# Patient Record
Sex: FEMALE | Race: WHITE | Employment: STUDENT | URBAN - METROPOLITAN AREA
[De-identification: names, ages, dates, MRNs, and addresses within clinical notes are randomized per-mention and may not be internally consistent; named-entity substitution may affect disease eponyms.]

---
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
## 2022-01-12 NOTE — H&P (VIEW-ONLY)
[facility] - Gastroenterology                                                                                                               Reason for consult: abd pain    Requesting physician or provider: Windy Prince MD    P [facility] (Girls, 2-20 Years) data. History, Medications, Allergies, ROS:      History reviewed. No pertinent past medical history. History reviewed. No pertinent surgical history. Family Hx: History reviewed. No pertinent family history.    Social Histor rashes  HEMATOLOGIC: No bleeding, no bruising  NEURO: Alert and interactive, normal gait    Labs/Imaging/Procedures:     Component   Ref Range & Units 10/12/21 11:20 AM   H. pylori Breath Test   Negative Negative      Component   Ref Range & Units 7/6/20   % 54.1    Lymphocyte %   % 37.8    Monocyte %   % 6.3    Eosinophil %   % 1.0    Basophil %   % 0.5    Immature Granulocyte %   % 0.3      .   ASSESSMENT/PLAN:   Catarino Reardon is a 25year old year-old female without significant PMHx:     #gerd  #bloat exist.

## 2022-01-12 NOTE — H&P
[facility] - Gastroenterology                                                                                                               Reason for consult: abd pain    Requesting physician or provider: Rhianna Graf MD    P [facility] (Girls, 2-20 Years) data. History, Medications, Allergies, ROS:      History reviewed. No pertinent past medical history. History reviewed. No pertinent surgical history. Family Hx: History reviewed. No pertinent family history.    Social Histor rashes  HEMATOLOGIC: No bleeding, no bruising  NEURO: Alert and interactive, normal gait    Labs/Imaging/Procedures:     Component   Ref Range & Units 10/12/21 11:20 AM   H. pylori Breath Test   Negative Negative      Component   Ref Range & Units 7/6/20   % 54.1    Lymphocyte %   % 37.8    Monocyte %   % 6.3    Eosinophil %   % 1.0    Basophil %   % 0.5    Immature Granulocyte %   % 0.3      .   ASSESSMENT/PLAN:   Brook Smith is a 25year old year-old female without significant PMHx:     #gerd  #bloat exist.

## 2022-01-14 RX ORDER — PANTOPRAZOLE SODIUM 40 MG/1
TABLET, DELAYED RELEASE ORAL
Qty: 90 TABLET | Refills: 0 | OUTPATIENT
Start: 2022-01-14

## 2022-01-14 RX ORDER — PANTOPRAZOLE SODIUM 40 MG/1
TABLET, DELAYED RELEASE ORAL
Qty: 30 TABLET | Refills: 1 | Status: SHIPPED | OUTPATIENT
Start: 2022-01-14

## 2022-01-18 ENCOUNTER — OFFICE VISIT (OUTPATIENT)
Dept: GASTROENTEROLOGY | Facility: CLINIC | Age: 22
End: 2022-01-18
Payer: COMMERCIAL

## 2022-01-18 ENCOUNTER — TELEPHONE (OUTPATIENT)
Dept: GASTROENTEROLOGY | Facility: CLINIC | Age: 22
End: 2022-01-18

## 2022-01-18 VITALS
HEART RATE: 58 BPM | DIASTOLIC BLOOD PRESSURE: 69 MMHG | HEIGHT: 70 IN | WEIGHT: 174 LBS | BODY MASS INDEX: 24.91 KG/M2 | SYSTOLIC BLOOD PRESSURE: 123 MMHG

## 2022-01-18 DIAGNOSIS — R11.0 NAUSEA: ICD-10-CM

## 2022-01-18 DIAGNOSIS — R10.33 PERIUMBILICAL ABDOMINAL PAIN: ICD-10-CM

## 2022-01-18 DIAGNOSIS — K21.9 GASTROESOPHAGEAL REFLUX DISEASE WITHOUT ESOPHAGITIS: Primary | ICD-10-CM

## 2022-01-18 DIAGNOSIS — R14.0 BLOATING: ICD-10-CM

## 2022-01-18 DIAGNOSIS — K21.9 GASTROESOPHAGEAL REFLUX DISEASE, UNSPECIFIED WHETHER ESOPHAGITIS PRESENT: Primary | ICD-10-CM

## 2022-01-18 PROCEDURE — 99244 OFF/OP CNSLTJ NEW/EST MOD 40: CPT | Performed by: NURSE PRACTITIONER

## 2022-01-18 PROCEDURE — 3078F DIAST BP <80 MM HG: CPT | Performed by: NURSE PRACTITIONER

## 2022-01-18 PROCEDURE — 3008F BODY MASS INDEX DOCD: CPT | Performed by: NURSE PRACTITIONER

## 2022-01-18 PROCEDURE — 3074F SYST BP LT 130 MM HG: CPT | Performed by: NURSE PRACTITIONER

## 2022-01-18 NOTE — PATIENT INSTRUCTIONS
-continue pantoprazole 40 mg/daily  -FDgard  -dicyclomine as needed  -labs  -attempt to identify dietary triggers by keeping a food journal    egd w/ [facility] w/ Dr. Delgado Batch  Dx: gerd, bloating, nausea, periumbilical abd pain    Tips to Control Acid Reflux    To

## 2022-01-18 NOTE — TELEPHONE ENCOUNTER
Scheduled for: EGD 39780    Provider Name: Dr Esthela Colon  Date: Wed 1/27/2022   Location: [address] [place] [address]    Sedation: MAC    Time: 1:30 pm   Prep:  Nothing after midnight the day before procedure and NPO 3 hrs prior procedure  Meds/Allergies Reconciled?: NKDA    Diagnosi

## 2022-01-21 RX ORDER — NORETHINDRONE ACETATE AND ETHINYL ESTRADIOL 1.5-30(21)
1 KIT ORAL DAILY
Qty: 84 TABLET | Refills: 4 | Status: SHIPPED | OUTPATIENT
Start: 2022-01-21 | End: 2023-02-22

## 2022-01-21 NOTE — TELEPHONE ENCOUNTER
Please Review. Protocol Failed or has no protocol.        Requested Prescriptions   Pending Prescriptions Disp Refills    MICROGESTIN FE 1.5/30 1.5-30 MG-MCG Oral Tab [Pharmacy Med Name: Cooper Driscoll 1.5/30 FE TABLETS 28S] 84 tablet 0     Sig: TAKE 1 TABLET BY MOUTH DAILY        Gynecology Medication Protocol Failed - 1/20/2022  8:02 AM        Failed - Pass dependent on manual look-up of last PAP and patient compliance with PAP follow up recommendations        Passed - Appointment in past 12 or next 3 months            Recent Outpatient Visits              3 days ago Gastroesophageal reflux disease, unspecified whether esophagitis present    [address], [facility] [place], [facility], APR    Office Visit    2 months ago Abdominal cramping    Franklin Ferreira MD    Office Visit    3 months ago Gastroesophageal reflux disease, unspecified whether esophagitis present    [address]    Office Visit    1 year ago Well adolescent visit    [address]    Office Visit    2 years ago Sore throat    [address] [facility] [address] [facility] [address] [facility], [facility]    Office Visit          Future Appointments         Provider Department Appt Notes    In 6 days RASHAUN, PROCEDURE [address] GI PROCEDURE EGD @ [facility]

## 2022-01-22 NOTE — PAT NURSING NOTE
Verified with patient that procedure will be performed at [facility] and not at [facility] AND [facility], address provided. Informed no visitors allowed in facility, patient verbalized understanding and agreed.

## 2022-01-24 ENCOUNTER — LAB ENCOUNTER (OUTPATIENT)
Dept: LAB | Age: 22
End: 2022-01-24
Attending: INTERNAL MEDICINE
Payer: COMMERCIAL

## 2022-01-24 DIAGNOSIS — Z01.818 PRE-OP TESTING: ICD-10-CM

## 2022-01-26 LAB — SARS-COV-2 RNA RESP QL NAA+PROBE: NOT DETECTED

## 2022-01-27 ENCOUNTER — HOSPITAL ENCOUNTER (OUTPATIENT)
Age: 22
Setting detail: HOSPITAL OUTPATIENT SURGERY
Discharge: HOME | End: 2022-01-27
Attending: INTERNAL MEDICINE | Admitting: INTERNAL MEDICINE
Payer: COMMERCIAL

## 2022-01-27 ENCOUNTER — ANESTHESIA EVENT (OUTPATIENT)
Dept: ENDOSCOPY | Age: 22
End: 2022-01-27
Payer: COMMERCIAL

## 2022-01-27 ENCOUNTER — ANESTHESIA (OUTPATIENT)
Dept: ENDOSCOPY | Age: 22
End: 2022-01-27
Payer: COMMERCIAL

## 2022-01-27 VITALS
HEIGHT: 70 IN | HEART RATE: 53 BPM | RESPIRATION RATE: 17 BRPM | SYSTOLIC BLOOD PRESSURE: 118 MMHG | BODY MASS INDEX: 23.91 KG/M2 | DIASTOLIC BLOOD PRESSURE: 55 MMHG | WEIGHT: 167 LBS | OXYGEN SATURATION: 100 %

## 2022-01-27 DIAGNOSIS — R14.0 BLOATING: ICD-10-CM

## 2022-01-27 DIAGNOSIS — R10.33 PERIUMBILICAL ABDOMINAL PAIN: ICD-10-CM

## 2022-01-27 DIAGNOSIS — R11.0 NAUSEA: ICD-10-CM

## 2022-01-27 DIAGNOSIS — K21.9 GASTROESOPHAGEAL REFLUX DISEASE WITHOUT ESOPHAGITIS: ICD-10-CM

## 2022-01-27 DIAGNOSIS — Z01.818 PRE-OP TESTING: Primary | ICD-10-CM

## 2022-01-27 LAB — B-HCG UR QL: NEGATIVE

## 2022-01-27 PROCEDURE — 43239 EGD BIOPSY SINGLE/MULTIPLE: CPT | Performed by: INTERNAL MEDICINE

## 2022-01-27 PROCEDURE — 99070 SPECIAL SUPPLIES PHYS/QHP: CPT | Performed by: INTERNAL MEDICINE

## 2022-01-27 RX ORDER — SODIUM CHLORIDE, SODIUM LACTATE, POTASSIUM CHLORIDE, CALCIUM CHLORIDE 600; 310; 30; 20 MG/100ML; MG/100ML; MG/100ML; MG/100ML
INJECTION, SOLUTION INTRAVENOUS CONTINUOUS
Status: DISCONTINUED | OUTPATIENT
Start: 2022-01-27 | End: 2022-01-27

## 2022-01-27 RX ORDER — LIDOCAINE HYDROCHLORIDE 10 MG/ML
INJECTION, SOLUTION EPIDURAL; INFILTRATION; INTRACAUDAL; PERINEURAL AS NEEDED
Status: DISCONTINUED | OUTPATIENT
Start: 2022-01-27 | End: 2022-01-27 | Stop reason: SURG

## 2022-01-27 RX ORDER — NALOXONE HYDROCHLORIDE 0.4 MG/ML
80 INJECTION, SOLUTION INTRAMUSCULAR; INTRAVENOUS; SUBCUTANEOUS AS NEEDED
OUTPATIENT
Start: 2022-01-27 | End: 2022-01-27

## 2022-01-27 RX ORDER — SODIUM CHLORIDE, SODIUM LACTATE, POTASSIUM CHLORIDE, CALCIUM CHLORIDE 600; 310; 30; 20 MG/100ML; MG/100ML; MG/100ML; MG/100ML
INJECTION, SOLUTION INTRAVENOUS CONTINUOUS
OUTPATIENT
Start: 2022-01-27

## 2022-01-27 RX ADMIN — SODIUM CHLORIDE, SODIUM LACTATE, POTASSIUM CHLORIDE, CALCIUM CHLORIDE: 600; 310; 30; 20 INJECTION, SOLUTION INTRAVENOUS at 12:46:00

## 2022-01-27 RX ADMIN — LIDOCAINE HYDROCHLORIDE 50 MG: 10 INJECTION, SOLUTION EPIDURAL; INFILTRATION; INTRACAUDAL; PERINEURAL at 12:49:00

## 2022-01-27 RX ADMIN — SODIUM CHLORIDE, SODIUM LACTATE, POTASSIUM CHLORIDE, CALCIUM CHLORIDE: 600; 310; 30; 20 INJECTION, SOLUTION INTRAVENOUS at 13:00:00

## 2022-01-27 NOTE — ANESTHESIA POSTPROCEDURE EVALUATION
Patient: Mary Choi    Procedure Summary     Date: 01/27/22 Room / Location: [facility] ENDOSCOPY 01 / [facility] ENDO    Anesthesia Start: 0686 Anesthesia Stop: 6405    Procedure: ESOPHAGOGASTRODUODENOSCOPY (EGD) (N/A ) Diagnosis:       Gastroesophageal reflu

## 2022-01-27 NOTE — ANESTHESIA PREPROCEDURE EVALUATION
Anesthesia PreOp Note    HPI:     Celeste June is a 25year old female who presents for preoperative consultation requested by: Josephine Landry MD    Date of Surgery: 1/27/2022    Procedure(s):  ESOPHAGOGASTRODUODENOSCOPY (EGD)  Indication: Fabian Denton Grew up on a farm: Not Asked        History of tanning: Not Asked        Outdoor occupation: Not Asked        Pt has a pacemaker: No        Pt has a defibrillator: No        Breast feeding: Not Asked        Reaction to local anesthetic: No    Social Histor the best of my ability. The patient desires the anesthetic management as planned.   Zamzam Ceballos MD  1/27/2022 12:30 PM

## 2022-01-27 NOTE — INTERVAL H&P NOTE
Pre-op Diagnosis: Gastroesophageal reflux disease without esophagitis, Nausea, Periumbilical abdominal pain, Bloating    The above referenced H&P was reviewed by Sary Arechiga MD on 1/27/2022, the patient was examined and no significant changes have occurre

## 2022-01-27 NOTE — OPERATIVE REPORT
ESOPHAGOGASTRODUODENOSCOPY (EGD) REPORT    Pamn Lanes DOB 8/15/2000 Age 25year old   PCP Narendra Bates MD Endoscopist Cindy Corcoran MD       Date of procedure: 22    Procedure: EGD w/biopsies    Pre-operative diagnosis: acid reflux and bl Biopsies taken to rule out celiac    Recommend:  1.  Here are some reflux precautions:   - Avoid trigger foods  - Anti-reflux measures: raising the head of the bed at night, avoiding tight clothing or belts, avoiding eating late at night and not lying down

## 2022-02-15 RX ORDER — PANTOPRAZOLE SODIUM 40 MG/1
40 TABLET, DELAYED RELEASE ORAL
Qty: 90 TABLET | Refills: 1 | Status: SHIPPED | OUTPATIENT
Start: 2022-02-15 | End: 2022-04-15

## 2022-02-15 NOTE — TELEPHONE ENCOUNTER
Refill passed per [address] [facility] [address] protocol.   Requested Prescriptions   Pending Prescriptions Disp Refills    PANTOPRAZOLE 40 MG Oral Tab EC [Pharmacy Med Name: PANTOPRAZOLE 40MG TABLETS] 30 tablet 1     Sig: TAKE 1 TABLET(40 MG) BY MOUTH EVERY MORNING BEFORE BREAKFAST        Gastrointestional Medication Protocol Passed - 2/15/2022  8:02 AM        Passed - Appointment in past 12 or next 3 months             Recent Outpatient Visits              4 weeks ago Gastroesophageal reflux disease, unspecified whether esophagitis present    [address], [address] [facility] [place], APRN    Office Visit    3 months ago Abdominal cramping    [address] MD    Office Visit    4 months ago Gastroesophageal reflux disease, unspecified whether esophagitis present    [address] MD    Office Visit    1 year ago Well adolescent visit    [address] [facility]    Office Visit    2 years ago Sore throat    [address], [address], [facility], [address]    Office Visit

## 2022-02-17 ENCOUNTER — TELEPHONE (OUTPATIENT)
Dept: GASTROENTEROLOGY | Facility: CLINIC | Age: 22
End: 2022-02-17

## 2022-02-17 NOTE — TELEPHONE ENCOUNTER
Overdue reminder letter mailed out to patient.     Labs:   C-REACTIVE PROTEIN   CBC WITH DIFFERENTIAL WITH PLATELET   COMP METABOLIC PANEL (14)   IMMUNOGLOBULIN A, QUANT   TISSUE TRANSGLUTAMINASE AB IGA   TSH W REFLEX TO FREE T4   VITAMIN B12

## 2022-02-28 ENCOUNTER — TELEPHONE (OUTPATIENT)
Dept: GASTROENTEROLOGY | Facility: CLINIC | Age: 22
End: 2022-02-28

## 2022-02-28 NOTE — TELEPHONE ENCOUNTER
Mother requesting to have lab orders reentered so pt can complete labs at an [address] [place] [address].  Please call mother once labs have been entered [phone]

## 2022-02-28 NOTE — TELEPHONE ENCOUNTER
Phu Caputo--    Patients mother requesting lab orders be reentered to [facility] as resulting lab. Please adjust when appropriate. Thank you!

## 2022-03-01 ENCOUNTER — TELEPHONE (OUTPATIENT)
Dept: GASTROENTEROLOGY | Facility: CLINIC | Age: 22
End: 2022-03-01

## 2022-03-01 NOTE — TELEPHONE ENCOUNTER
----- Message from Brennan Tyler MD sent at 3/1/2022  5:09 PM CST -----  Follow up with Madelin vyas available

## 2022-03-02 NOTE — TELEPHONE ENCOUNTER
S/p endoscopy and duodenal bx (-) celiac so celiac serologies are not needed. Lab orders entered for [facility].     Thanks,  C

## 2022-03-02 NOTE — TELEPHONE ENCOUNTER
Left message for Gina Hernandez (on TUNG) that labs were changed to be completed at [facility] lab location. nextsocial message also sent with this information.

## 2022-03-04 NOTE — TELEPHONE ENCOUNTER
Attempted to reach [place] to review EGD pathology findings and coordinate follow up visit with Messi Rodriguez; next available. Left message to call back. 2nd attempt. Will await call back and/or follow up. Underwent egd with Dr. Zechariah Mcfarland 01/27/2022. Pending labs as of 03/04/2022--still active in chart.

## 2022-03-11 ENCOUNTER — LAB ENCOUNTER (OUTPATIENT)
Dept: LAB | Age: 22
End: 2022-03-11
Attending: NURSE PRACTITIONER
Payer: COMMERCIAL

## 2022-03-11 LAB
ALBUMIN SERPL-MCNC: 3.9 G/DL (ref 3.4–5)
ALBUMIN/GLOB SERPL: 1.1 {RATIO} (ref 1–2)
ALP LIVER SERPL-CCNC: 95 U/L
ALT SERPL-CCNC: 16 U/L
ANION GAP SERPL CALC-SCNC: 6 MMOL/L (ref 0–18)
AST SERPL-CCNC: 13 U/L (ref 15–37)
BASOPHILS # BLD AUTO: 0.04 X10(3) UL (ref 0–0.2)
BASOPHILS NFR BLD AUTO: 0.7 %
BILIRUB SERPL-MCNC: 0.5 MG/DL (ref 0.1–2)
BUN BLD-MCNC: 10 MG/DL (ref 7–18)
BUN/CREAT SERPL: 14.3 (ref 10–20)
CALCIUM BLD-MCNC: 9.3 MG/DL (ref 8.5–10.1)
CHLORIDE SERPL-SCNC: 108 MMOL/L (ref 98–112)
CO2 SERPL-SCNC: 25 MMOL/L (ref 21–32)
CREAT BLD-MCNC: 0.7 MG/DL
CRP SERPL-MCNC: <0.29 MG/DL (ref ?–0.3)
DEPRECATED RDW RBC AUTO: 41.5 FL (ref 35.1–46.3)
EOSINOPHIL # BLD AUTO: 0.09 X10(3) UL (ref 0–0.7)
EOSINOPHIL NFR BLD AUTO: 1.5 %
ERYTHROCYTE [DISTWIDTH] IN BLOOD BY AUTOMATED COUNT: 12.7 % (ref 11–15)
FASTING STATUS PATIENT QL REPORTED: YES
GLOBULIN PLAS-MCNC: 3.5 G/DL (ref 2.8–4.4)
GLUCOSE BLD-MCNC: 81 MG/DL (ref 70–99)
HGB BLD-MCNC: 13.3 G/DL
IMM GRANULOCYTES # BLD AUTO: 0.01 X10(3) UL (ref 0–1)
IMM GRANULOCYTES NFR BLD: 0.2 %
LYMPHOCYTES # BLD AUTO: 2.43 X10(3) UL (ref 1.5–5)
LYMPHOCYTES NFR BLD AUTO: 40 %
MCH RBC QN AUTO: 29 PG (ref 26–34)
MCHC RBC AUTO-ENTMCNC: 32.2 G/DL (ref 31–37)
MCV RBC AUTO: 90.2 FL
MONOCYTES # BLD AUTO: 0.45 X10(3) UL (ref 0.1–1)
MONOCYTES NFR BLD AUTO: 7.4 %
NEUTROPHILS # BLD AUTO: 3.06 X10 (3) UL (ref 1.5–7.7)
NEUTROPHILS # BLD AUTO: 3.06 X10(3) UL (ref 1.5–7.7)
NEUTROPHILS NFR BLD AUTO: 50.2 %
OSMOLALITY SERPL CALC.SUM OF ELEC: 286 MOSM/KG (ref 275–295)
PLATELET # BLD AUTO: 290 10(3)UL (ref 150–450)
POTASSIUM SERPL-SCNC: 4.5 MMOL/L (ref 3.5–5.1)
PROT SERPL-MCNC: 7.4 G/DL (ref 6.4–8.2)
RBC # BLD AUTO: 4.58 X10(6)UL
SODIUM SERPL-SCNC: 139 MMOL/L (ref 136–145)
TSI SER-ACNC: 1.17 MIU/ML (ref 0.36–3.74)
VIT B12 SERPL-MCNC: 593 PG/ML (ref 193–986)
WBC # BLD AUTO: 6.1 X10(3) UL (ref 4–11)

## 2022-03-11 PROCEDURE — 36415 COLL VENOUS BLD VENIPUNCTURE: CPT | Performed by: NURSE PRACTITIONER

## 2022-03-11 PROCEDURE — 84443 ASSAY THYROID STIM HORMONE: CPT | Performed by: NURSE PRACTITIONER

## 2022-03-11 PROCEDURE — 80053 COMPREHEN METABOLIC PANEL: CPT | Performed by: NURSE PRACTITIONER

## 2022-03-11 PROCEDURE — 85025 COMPLETE CBC W/AUTO DIFF WBC: CPT | Performed by: NURSE PRACTITIONER

## 2022-03-11 PROCEDURE — 82607 VITAMIN B-12: CPT | Performed by: NURSE PRACTITIONER

## 2022-03-11 PROCEDURE — 86140 C-REACTIVE PROTEIN: CPT | Performed by: NURSE PRACTITIONER

## 2022-04-05 ENCOUNTER — OFFICE VISIT (OUTPATIENT)
Dept: FAMILY MEDICINE CLINIC | Facility: CLINIC | Age: 22
End: 2022-04-05
Payer: COMMERCIAL

## 2022-04-05 VITALS
BODY MASS INDEX: 25.11 KG/M2 | HEIGHT: 70 IN | SYSTOLIC BLOOD PRESSURE: 132 MMHG | TEMPERATURE: 97 F | DIASTOLIC BLOOD PRESSURE: 66 MMHG | HEART RATE: 70 BPM | WEIGHT: 175.38 LBS

## 2022-04-05 DIAGNOSIS — R55 VASOVAGAL SYNDROME: ICD-10-CM

## 2022-04-05 DIAGNOSIS — R10.9 ABDOMINAL CRAMPING: ICD-10-CM

## 2022-04-05 DIAGNOSIS — L70.9 ACNE, UNSPECIFIED ACNE TYPE: Primary | ICD-10-CM

## 2022-04-05 DIAGNOSIS — K21.00 GASTROESOPHAGEAL REFLUX DISEASE WITH ESOPHAGITIS WITHOUT HEMORRHAGE: ICD-10-CM

## 2022-04-05 DIAGNOSIS — M24.9 HYPERMOBILITY OF JOINT: ICD-10-CM

## 2022-04-05 PROCEDURE — 3075F SYST BP GE 130 - 139MM HG: CPT | Performed by: FAMILY MEDICINE

## 2022-04-05 PROCEDURE — 3008F BODY MASS INDEX DOCD: CPT | Performed by: FAMILY MEDICINE

## 2022-04-05 PROCEDURE — 3078F DIAST BP <80 MM HG: CPT | Performed by: FAMILY MEDICINE

## 2022-04-05 PROCEDURE — 99214 OFFICE O/P EST MOD 30 MIN: CPT | Performed by: FAMILY MEDICINE

## 2022-04-05 RX ORDER — SUCRALFATE 1 G/1
1 TABLET ORAL
Qty: 90 TABLET | Refills: 0 | Status: SHIPPED | OUTPATIENT
Start: 2022-04-05 | End: 2022-04-06

## 2022-04-06 ENCOUNTER — TELEPHONE (OUTPATIENT)
Dept: FAMILY MEDICINE CLINIC | Facility: CLINIC | Age: 22
End: 2022-04-06

## 2022-04-06 RX ORDER — SUCRALFATE 1 G/1
1 TABLET ORAL
Qty: 270 TABLET | Refills: 0 | Status: SHIPPED | OUTPATIENT
Start: 2022-04-06 | End: 2022-09-08

## 2022-04-09 RX ORDER — SUCRALFATE 1 G/1
TABLET ORAL
Qty: 270 TABLET | Refills: 0 | OUTPATIENT
Start: 2022-04-09

## 2022-04-15 RX ORDER — PANTOPRAZOLE SODIUM 40 MG/1
40 TABLET, DELAYED RELEASE ORAL
Qty: 90 TABLET | Refills: 1 | Status: SHIPPED | OUTPATIENT
Start: 2022-04-15 | End: 2022-09-08

## 2022-04-15 NOTE — TELEPHONE ENCOUNTER
Refill passed per [employer] [facility] protocol.   Requested Prescriptions   Pending Prescriptions Disp Refills    pantoprazole 40 MG Oral Tab EC 90 tablet 1     Sig: Take 1 tablet (40 mg total) by mouth before breakfast.        Gastrointestional Medication Protocol Passed - 4/15/2022 10:28 AM        Passed - Appointment in past 12 or next 3 months             Recent Outpatient Visits              1 week ago Acne, unspecified acne type    Danika Collazo MD    Office Visit    2 months ago Gastroesophageal reflux disease, unspecified whether esophagitis present    [employer], [facility], [address] [facility], [place] Blank APRN    Office Visit    5 months ago Abdominal cramping    Danika Collazo MD    Office Visit    6 months ago Gastroesophageal reflux disease, unspecified whether esophagitis present    Danika Collazo MD    Office Visit    2 years ago Well adolescent visit    Caity Collazo Dian Hailey, MD    Office Visit

## 2022-04-20 ENCOUNTER — TELEPHONE (OUTPATIENT)
Dept: FAMILY MEDICINE CLINIC | Facility: CLINIC | Age: 22
End: 2022-04-20

## 2022-04-20 NOTE — TELEPHONE ENCOUNTER
Mom indicated that contacted  [facility] office but they are not accepting any new patient's. Only seeing [facility] patient's. Please advise.

## 2022-04-21 NOTE — TELEPHONE ENCOUNTER
Please help me find the correct genetic counselor within network to evaluate for hypermobility syndrome.

## 2022-04-25 NOTE — TELEPHONE ENCOUNTER
Maura Mc does not evaluate for this condition. She gave me a list of these clinicians -   Gabriela Devries at [facility], Donna Toledo at [facility], Delores Lynch at [facility] or Loral Gottron at [facility]    Please let me know to whom I can refer.

## 2022-04-28 NOTE — TELEPHONE ENCOUNTER
Please let pt and mom know that approved.  Matheus/connie is now with [facility] so if his office said only [employer] patients - she is a [employer] patient

## 2022-05-05 ENCOUNTER — APPOINTMENT (OUTPATIENT)
Dept: URBAN - METROPOLITAN AREA CLINIC 244 | Age: 22
Setting detail: DERMATOLOGY
End: 2022-05-05

## 2022-05-05 DIAGNOSIS — L70.0 ACNE VULGARIS: ICD-10-CM

## 2022-05-05 PROCEDURE — OTHER COUNSELING: OTHER

## 2022-05-05 PROCEDURE — 99204 OFFICE O/P NEW MOD 45 MIN: CPT

## 2022-05-05 PROCEDURE — OTHER PRESCRIPTION: OTHER

## 2022-05-05 RX ORDER — TRETIONIN 0.25 MG/G
CREAM TOPICAL
Qty: 45 | Refills: 1 | Status: ERX | COMMUNITY
Start: 2022-05-05

## 2022-05-05 RX ORDER — MINOCYCLINE HYDROCHLORIDE 100 MG/1
CAPSULE ORAL
Qty: 60 | Refills: 1 | Status: ERX | COMMUNITY
Start: 2022-05-05

## 2022-05-05 ASSESSMENT — LOCATION ZONE DERM: LOCATION ZONE: FACE

## 2022-05-05 ASSESSMENT — LOCATION SIMPLE DESCRIPTION DERM: LOCATION SIMPLE: RIGHT CHEEK

## 2022-05-05 ASSESSMENT — LOCATION DETAILED DESCRIPTION DERM: LOCATION DETAILED: RIGHT INFERIOR CENTRAL MALAR CHEEK

## 2022-05-05 NOTE — PROCEDURE: COUNSELING
High Dose Vitamin A Pregnancy And Lactation Text: High dose vitamin A therapy is contraindicated during pregnancy and breast feeding.
Tetracycline Pregnancy And Lactation Text: This medication is Pregnancy Category D and not consider safe during pregnancy. It is also excreted in breast milk.
Tazorac Pregnancy And Lactation Text: This medication is not safe during pregnancy. It is unknown if this medication is excreted in breast milk.
Azelaic Acid Pregnancy And Lactation Text: This medication is considered safe during pregnancy and breast feeding.
Tazorac Counseling:  Patient advised that medication is irritating and drying.  Patient may need to apply sparingly and wash off after an hour before eventually leaving it on overnight.  The patient verbalized understanding of the proper use and possible adverse effects of tazorac.  All of the patient's questions and concerns were addressed.
Birth Control Pills Pregnancy And Lactation Text: This medication should be avoided if pregnant and for the first 30 days post-partum.
Topical Clindamycin Pregnancy And Lactation Text: This medication is Pregnancy Category B and is considered safe during pregnancy. It is unknown if it is excreted in breast milk.
Use Enhanced Medication Counseling?: No
Isotretinoin Counseling: Patient should get monthly blood tests, not donate blood, not drive at night if vision affected, not share medication, and not undergo elective surgery for 6 months after tx completed. Side effects reviewed, pt to contact office should one occur.
Topical Retinoid counseling:  Patient advised to apply a pea-sized amount only at bedtime and wait 30 minutes after washing their face before applying.  If too drying, patient may add a non-comedogenic moisturizer. The patient verbalized understanding of the proper use and possible adverse effects of retinoids.  All of the patient's questions and concerns were addressed.
Topical Clindamycin Counseling: Patient counseled that this medication may cause skin irritation or allergic reactions.  In the event of skin irritation, the patient was advised to reduce the amount of the drug applied or use it less frequently.   The patient verbalized understanding of the proper use and possible adverse effects of clindamycin.  All of the patient's questions and concerns were addressed.
Benzoyl Peroxide Counseling: Patient counseled that medicine may cause skin irritation and bleach clothing.  In the event of skin irritation, the patient was advised to reduce the amount of the drug applied or use it less frequently.   The patient verbalized understanding of the proper use and possible adverse effects of benzoyl peroxide.  All of the patient's questions and concerns were addressed.
Erythromycin Pregnancy And Lactation Text: This medication is Pregnancy Category B and is considered safe during pregnancy. It is also excreted in breast milk.
Spironolactone Counseling: Patient advised regarding risks of diarrhea, abdominal pain, hyperkalemia, birth defects (for female patients), liver toxicity and renal toxicity. The patient may need blood work to monitor liver and kidney function and potassium levels while on therapy. The patient verbalized understanding of the proper use and possible adverse effects of spironolactone.  All of the patient's questions and concerns were addressed.
Topical Retinoid Pregnancy And Lactation Text: This medication is Pregnancy Category C. It is unknown if this medication is excreted in breast milk.
Dapsone Counseling: I discussed with the patient the risks of dapsone including but not limited to hemolytic anemia, agranulocytosis, rashes, methemoglobinemia, kidney failure, peripheral neuropathy, headaches, GI upset, and liver toxicity.  Patients who start dapsone require monitoring including baseline LFTs and weekly CBCs for the first month, then every month thereafter.  The patient verbalized understanding of the proper use and possible adverse effects of dapsone.  All of the patient's questions and concerns were addressed.
Tetracycline Counseling: Patient counseled regarding possible photosensitivity and increased risk for sunburn.  Patient instructed to avoid sunlight, if possible.  When exposed to sunlight, patients should wear protective clothing, sunglasses, and sunscreen.  The patient was instructed to call the office immediately if the following severe adverse effects occur:  hearing changes, easy bruising/bleeding, severe headache, or vision changes.  The patient verbalized understanding of the proper use and possible adverse effects of tetracycline.  All of the patient's questions and concerns were addressed. Patient understands to avoid pregnancy while on therapy due to potential birth defects.
Azithromycin Counseling:  I discussed with the patient the risks of azithromycin including but not limited to GI upset, allergic reaction, drug rash, diarrhea, and yeast infections.
Topical Sulfur Applications Pregnancy And Lactation Text: This medication is Pregnancy Category C and has an unknown safety profile during pregnancy. It is unknown if this topical medication is excreted in breast milk.
Winlevi Pregnancy And Lactation Text: This medication is considered safe during pregnancy and breastfeeding.
Doxycycline Counseling:  Patient counseled regarding possible photosensitivity and increased risk for sunburn.  Patient instructed to avoid sunlight, if possible.  When exposed to sunlight, patients should wear protective clothing, sunglasses, and sunscreen.  The patient was instructed to call the office immediately if the following severe adverse effects occur:  hearing changes, easy bruising/bleeding, severe headache, or vision changes.  The patient verbalized understanding of the proper use and possible adverse effects of doxycycline.  All of the patient's questions and concerns were addressed.
Topical Sulfur Applications Counseling: Topical Sulfur Counseling: Patient counseled that this medication may cause skin irritation or allergic reactions.  In the event of skin irritation, the patient was advised to reduce the amount of the drug applied or use it less frequently.   The patient verbalized understanding of the proper use and possible adverse effects of topical sulfur application.  All of the patient's questions and concerns were addressed.
Detail Level: Zone
Doxycycline Pregnancy And Lactation Text: This medication is Pregnancy Category D and not consider safe during pregnancy. It is also excreted in breast milk but is considered safe for shorter treatment courses.
Azelaic Acid Counseling: Patient counseled that medicine may cause skin irritation and to avoid applying near the eyes.  In the event of skin irritation, the patient was advised to reduce the amount of the drug applied or use it less frequently.   The patient verbalized understanding of the proper use and possible adverse effects of azelaic acid.  All of the patient's questions and concerns were addressed.
Azithromycin Pregnancy And Lactation Text: This medication is considered safe during pregnancy and is also secreted in breast milk.
Spironolactone Pregnancy And Lactation Text: This medication can cause feminization of the male fetus and should be avoided during pregnancy. The active metabolite is also found in breast milk.
High Dose Vitamin A Counseling: Side effects reviewed, pt to contact office should one occur.
Birth Control Pills Counseling: Birth Control Pill Counseling: I discussed with the patient the potential side effects of OCPs including but not limited to increased risk of stroke, heart attack, thrombophlebitis, deep venous thrombosis, hepatic adenomas, breast changes, GI upset, headaches, and depression.  The patient verbalized understanding of the proper use and possible adverse effects of OCPs. All of the patient's questions and concerns were addressed.
Erythromycin Counseling:  I discussed with the patient the risks of erythromycin including but not limited to GI upset, allergic reaction, drug rash, diarrhea, increase in liver enzymes, and yeast infections.
Bactrim Pregnancy And Lactation Text: This medication is Pregnancy Category D and is known to cause fetal risk.  It is also excreted in breast milk.
Winlevi Counseling:  I discussed with the patient the risks of topical clascoterone including but not limited to erythema, scaling, itching, and stinging. Patient voiced their understanding.
Isotretinoin Pregnancy And Lactation Text: This medication is Pregnancy Category X and is considered extremely dangerous during pregnancy. It is unknown if it is excreted in breast milk.
Sarecycline Counseling: Patient advised regarding possible photosensitivity and discoloration of the teeth, skin, lips, tongue and gums.  Patient instructed to avoid sunlight, if possible.  When exposed to sunlight, patients should wear protective clothing, sunglasses, and sunscreen.  The patient was instructed to call the office immediately if the following severe adverse effects occur:  hearing changes, easy bruising/bleeding, severe headache, or vision changes.  The patient verbalized understanding of the proper use and possible adverse effects of sarecycline.  All of the patient's questions and concerns were addressed.
Bactrim Counseling:  I discussed with the patient the risks of sulfa antibiotics including but not limited to GI upset, allergic reaction, drug rash, diarrhea, dizziness, photosensitivity, and yeast infections.  Rarely, more serious reactions can occur including but not limited to aplastic anemia, agranulocytosis, methemoglobinemia, blood dyscrasias, liver or kidney failure, lung infiltrates or desquamative/blistering drug rashes.
Benzoyl Peroxide Pregnancy And Lactation Text: This medication is Pregnancy Category C. It is unknown if benzoyl peroxide is excreted in breast milk.
Dapsone Pregnancy And Lactation Text: This medication is Pregnancy Category C and is not considered safe during pregnancy or breast feeding.
Minocycline Counseling: Patient advised regarding possible photosensitivity and discoloration of the teeth, skin, lips, tongue and gums.  Patient instructed to avoid sunlight, if possible.  When exposed to sunlight, patients should wear protective clothing, sunglasses, and sunscreen.  The patient was instructed to call the office immediately if the following severe adverse effects occur:  hearing changes, easy bruising/bleeding, severe headache, or vision changes.  The patient verbalized understanding of the proper use and possible adverse effects of minocycline.  All of the patient's questions and concerns were addressed.

## 2022-06-29 RX ORDER — TRETIONIN 0.25 MG/G
CREAM TOPICAL
Qty: 45 | Refills: 1 | Status: ERX

## 2022-06-29 RX ORDER — MINOCYCLINE HYDROCHLORIDE 100 MG/1
CAPSULE ORAL
Qty: 60 | Refills: 1 | Status: ERX

## 2022-08-29 ENCOUNTER — HOSPITAL ENCOUNTER (OUTPATIENT)
Age: 22
Discharge: HOME | End: 2022-08-29
Payer: COMMERCIAL

## 2022-08-29 ENCOUNTER — NURSE TRIAGE (OUTPATIENT)
Dept: FAMILY MEDICINE CLINIC | Facility: CLINIC | Age: 22
End: 2022-08-29

## 2022-08-29 VITALS
DIASTOLIC BLOOD PRESSURE: 77 MMHG | HEART RATE: 89 BPM | TEMPERATURE: 98 F | SYSTOLIC BLOOD PRESSURE: 131 MMHG | RESPIRATION RATE: 18 BRPM | OXYGEN SATURATION: 99 %

## 2022-08-29 DIAGNOSIS — J02.9 SORE THROAT: Primary | ICD-10-CM

## 2022-08-29 DIAGNOSIS — Z20.822 LAB TEST NEGATIVE FOR COVID-19 VIRUS: ICD-10-CM

## 2022-08-29 LAB
S PYO AG THROAT QL: NEGATIVE
SARS-COV-2 RNA RESP QL NAA+PROBE: NOT DETECTED

## 2022-08-29 PROCEDURE — 99213 OFFICE O/P EST LOW 20 MIN: CPT

## 2022-08-29 PROCEDURE — 87880 STREP A ASSAY W/OPTIC: CPT

## 2022-08-29 PROCEDURE — 99212 OFFICE O/P EST SF 10 MIN: CPT

## 2022-08-29 NOTE — ED INITIAL ASSESSMENT (HPI)
PATIENT ARRIVED AMBULATORY TO ROOM WITH MOTHER C/O SYMPTOMS THAT STARTED 2 DAYS AGO. +SORE THROAT. +FEVERS. NO COUGH. NO NASAL CONGESTION. NO N/V/D. EASY NON LABORED RESPIRATIONS.

## 2022-09-08 ENCOUNTER — OFFICE VISIT (OUTPATIENT)
Dept: FAMILY MEDICINE CLINIC | Facility: CLINIC | Age: 22
End: 2022-09-08
Payer: COMMERCIAL

## 2022-09-08 VITALS
HEART RATE: 79 BPM | SYSTOLIC BLOOD PRESSURE: 120 MMHG | HEIGHT: 71 IN | TEMPERATURE: 98 F | DIASTOLIC BLOOD PRESSURE: 81 MMHG | BODY MASS INDEX: 24.69 KG/M2 | WEIGHT: 176.38 LBS

## 2022-09-08 DIAGNOSIS — K21.9 GASTROESOPHAGEAL REFLUX DISEASE WITHOUT ESOPHAGITIS: Primary | ICD-10-CM

## 2022-09-08 PROCEDURE — 99214 OFFICE O/P EST MOD 30 MIN: CPT | Performed by: FAMILY MEDICINE

## 2022-09-08 PROCEDURE — 3079F DIAST BP 80-89 MM HG: CPT | Performed by: FAMILY MEDICINE

## 2022-09-08 PROCEDURE — 3008F BODY MASS INDEX DOCD: CPT | Performed by: FAMILY MEDICINE

## 2022-09-08 PROCEDURE — 3074F SYST BP LT 130 MM HG: CPT | Performed by: FAMILY MEDICINE

## 2022-09-08 RX ORDER — ONDANSETRON 4 MG/1
4 TABLET, ORALLY DISINTEGRATING ORAL EVERY 8 HOURS PRN
Qty: 30 TABLET | Refills: 0 | Status: SHIPPED | OUTPATIENT
Start: 2022-09-08

## 2022-09-08 RX ORDER — PANTOPRAZOLE SODIUM 40 MG/1
40 TABLET, DELAYED RELEASE ORAL
Qty: 180 TABLET | Refills: 1 | Status: SHIPPED | OUTPATIENT
Start: 2022-09-08

## 2022-09-10 ENCOUNTER — LAB ENCOUNTER (OUTPATIENT)
Dept: LAB | Age: 22
End: 2022-09-10
Attending: FAMILY MEDICINE
Payer: COMMERCIAL

## 2022-09-10 DIAGNOSIS — K21.9 GASTROESOPHAGEAL REFLUX DISEASE WITHOUT ESOPHAGITIS: ICD-10-CM

## 2022-09-10 PROCEDURE — 36415 COLL VENOUS BLD VENIPUNCTURE: CPT

## 2022-09-10 PROCEDURE — 82941 ASSAY OF GASTRIN: CPT

## 2022-09-14 LAB — GASTRIN LEVEL: 73 PG/ML

## 2022-11-12 NOTE — TELEPHONE ENCOUNTER
Please review. Protocol Failed or has No Protocol. Requested Prescriptions   Pending Prescriptions Disp Refills    ondansetron 4 MG Oral Tablet Dispersible 30 tablet 0     Sig: Take 1 tablet (4 mg total) by mouth every 8 (eight) hours as needed for Nausea.        There is no refill protocol information for this order          Recent Outpatient Visits              2 months ago Gastroesophageal reflux disease without esophagitis    Kaylie Brambila MD    Office Visit    7 months ago Acne, unspecified acne type    Kaylie Brambila MD    Office Visit    9 months ago Gastroesophageal reflux disease, unspecified whether esophagitis present    [facility], [facility], [address] [place] [address], [facility]    Office Visit    12 months ago Abdominal cramping    Kaylie Brambila MD    Office Visit    1 year ago Gastroesophageal reflux disease, unspecified whether esophagitis present    Jose Brambila Marvin Frizzle, MD    Office Visit

## 2022-11-13 RX ORDER — ONDANSETRON 4 MG/1
4 TABLET, ORALLY DISINTEGRATING ORAL EVERY 8 HOURS PRN
Qty: 30 TABLET | Refills: 0 | Status: SHIPPED | OUTPATIENT
Start: 2022-11-13

## 2022-12-13 RX ORDER — MINOCYCLINE HYDROCHLORIDE 100 MG/1
CAPSULE ORAL
Qty: 60 | Refills: 1 | Status: CANCELLED

## 2022-12-14 ENCOUNTER — APPOINTMENT (OUTPATIENT)
Dept: URBAN - METROPOLITAN AREA CLINIC 244 | Age: 22
Setting detail: DERMATOLOGY
End: 2022-12-15

## 2022-12-14 DIAGNOSIS — L70.0 ACNE VULGARIS: ICD-10-CM

## 2022-12-14 PROCEDURE — OTHER ADDITIONAL NOTES: OTHER

## 2022-12-14 PROCEDURE — OTHER PRESCRIPTION MEDICATION MANAGEMENT: OTHER

## 2022-12-14 PROCEDURE — OTHER COUNSELING: OTHER

## 2022-12-14 PROCEDURE — OTHER PRESCRIPTION: OTHER

## 2022-12-14 PROCEDURE — 99214 OFFICE O/P EST MOD 30 MIN: CPT

## 2022-12-14 RX ORDER — MINOCYCLINE HYDROCHLORIDE 100 MG/1
CAPSULE ORAL
Qty: 60 | Refills: 2 | Status: ERX

## 2022-12-14 RX ORDER — CLINDAMYCIN PHOSPHATE AND BENZOYL PEROXIDE 1 %-5 %
KIT TOPICAL
Qty: 50 | Refills: 4 | Status: ERX | COMMUNITY
Start: 2022-12-14

## 2022-12-14 RX ORDER — TRETIONIN 0.25 MG/G
CREAM TOPICAL
Qty: 45 | Refills: 1 | Status: ACTIVE

## 2022-12-14 ASSESSMENT — LOCATION DETAILED DESCRIPTION DERM: LOCATION DETAILED: LEFT INFERIOR CENTRAL MALAR CHEEK

## 2022-12-14 ASSESSMENT — LOCATION ZONE DERM: LOCATION ZONE: FACE

## 2022-12-14 ASSESSMENT — LOCATION SIMPLE DESCRIPTION DERM: LOCATION SIMPLE: LEFT CHEEK

## 2022-12-14 NOTE — PROCEDURE: COUNSELING
Doxycycline Counseling:  Patient counseled regarding possible photosensitivity and increased risk for sunburn.  Patient instructed to avoid sunlight, if possible.  When exposed to sunlight, patients should wear protective clothing, sunglasses, and sunscreen.  The patient was instructed to call the office immediately if the following severe adverse effects occur:  hearing changes, easy bruising/bleeding, severe headache, or vision changes.  The patient verbalized understanding of the proper use and possible adverse effects of doxycycline.  All of the patient's questions and concerns were addressed.
Birth Control Pills Counseling: Birth Control Pill Counseling: I discussed with the patient the potential side effects of OCPs including but not limited to increased risk of stroke, heart attack, thrombophlebitis, deep venous thrombosis, hepatic adenomas, breast changes, GI upset, headaches, and depression.  The patient verbalized understanding of the proper use and possible adverse effects of OCPs. All of the patient's questions and concerns were addressed.
Detail Level: Zone
Spironolactone Counseling: Patient advised regarding risks of diarrhea, abdominal pain, hyperkalemia, birth defects (for female patients), liver toxicity and renal toxicity. The patient may need blood work to monitor liver and kidney function and potassium levels while on therapy. The patient verbalized understanding of the proper use and possible adverse effects of spironolactone.  All of the patient's questions and concerns were addressed.
Azithromycin Pregnancy And Lactation Text: This medication is considered safe during pregnancy and is also secreted in breast milk.
Minocycline Counseling: Patient advised regarding possible photosensitivity and discoloration of the teeth, skin, lips, tongue and gums.  Patient instructed to avoid sunlight, if possible.  When exposed to sunlight, patients should wear protective clothing, sunglasses, and sunscreen.  The patient was instructed to call the office immediately if the following severe adverse effects occur:  hearing changes, easy bruising/bleeding, severe headache, or vision changes.  The patient verbalized understanding of the proper use and possible adverse effects of minocycline.  All of the patient's questions and concerns were addressed.
Bactrim Pregnancy And Lactation Text: This medication is Pregnancy Category D and is known to cause fetal risk.  It is also excreted in breast milk.
Tazorac Counseling:  Patient advised that medication is irritating and drying.  Patient may need to apply sparingly and wash off after an hour before eventually leaving it on overnight.  The patient verbalized understanding of the proper use and possible adverse effects of tazorac.  All of the patient's questions and concerns were addressed.
Minocycline Pregnancy And Lactation Text: This medication is Pregnancy Category D and not consider safe during pregnancy. It is also excreted in breast milk.
Use Enhanced Medication Counseling?: No
Topical Clindamycin Counseling: Patient counseled that this medication may cause skin irritation or allergic reactions.  In the event of skin irritation, the patient was advised to reduce the amount of the drug applied or use it less frequently.   The patient verbalized understanding of the proper use and possible adverse effects of clindamycin.  All of the patient's questions and concerns were addressed.
Topical Retinoid Pregnancy And Lactation Text: This medication is Pregnancy Category C. It is unknown if this medication is excreted in breast milk.
Topical Clindamycin Pregnancy And Lactation Text: This medication is Pregnancy Category B and is considered safe during pregnancy. It is unknown if it is excreted in breast milk.
High Dose Vitamin A Counseling: Side effects reviewed, pt to contact office should one occur.
Erythromycin Pregnancy And Lactation Text: This medication is Pregnancy Category B and is considered safe during pregnancy. It is also excreted in breast milk.
Isotretinoin Counseling: Patient should get monthly blood tests, not donate blood, not drive at night if vision affected, not share medication, and not undergo elective surgery for 6 months after tx completed. Side effects reviewed, pt to contact office should one occur.
Topical Sulfur Applications Counseling: Topical Sulfur Counseling: Patient counseled that this medication may cause skin irritation or allergic reactions.  In the event of skin irritation, the patient was advised to reduce the amount of the drug applied or use it less frequently.   The patient verbalized understanding of the proper use and possible adverse effects of topical sulfur application.  All of the patient's questions and concerns were addressed.
Winlevi Counseling:  I discussed with the patient the risks of topical clascoterone including but not limited to erythema, scaling, itching, and stinging. Patient voiced their understanding.
Benzoyl Peroxide Counseling: Patient counseled that medicine may cause skin irritation and bleach clothing.  In the event of skin irritation, the patient was advised to reduce the amount of the drug applied or use it less frequently.   The patient verbalized understanding of the proper use and possible adverse effects of benzoyl peroxide.  All of the patient's questions and concerns were addressed.
Azelaic Acid Pregnancy And Lactation Text: This medication is considered safe during pregnancy and breast feeding.
Dapsone Pregnancy And Lactation Text: This medication is Pregnancy Category C and is not considered safe during pregnancy or breast feeding.
Azelaic Acid Counseling: Patient counseled that medicine may cause skin irritation and to avoid applying near the eyes.  In the event of skin irritation, the patient was advised to reduce the amount of the drug applied or use it less frequently.   The patient verbalized understanding of the proper use and possible adverse effects of azelaic acid.  All of the patient's questions and concerns were addressed.
Azithromycin Counseling:  I discussed with the patient the risks of azithromycin including but not limited to GI upset, allergic reaction, drug rash, diarrhea, and yeast infections.
Aklief counseling:  Patient advised to apply a pea-sized amount only at bedtime and wait 30 minutes after washing their face before applying.  If too drying, patient may add a non-comedogenic moisturizer.  The most commonly reported side effects including irritation, redness, scaling, dryness, stinging, burning, itching, and increased risk of sunburn.  The patient verbalized understanding of the proper use and possible adverse effects of retinoids.  All of the patient's questions and concerns were addressed.
Aklief Pregnancy And Lactation Text: It is unknown if this medication is safe to use during pregnancy.  It is unknown if this medication is excreted in breast milk.  Breastfeeding women should use the topical cream on the smallest area of the skin for the shortest time needed while breastfeeding.  Do not apply to nipple and areola.
Winlevi Pregnancy And Lactation Text: This medication is considered safe during pregnancy and breastfeeding.
Topical Sulfur Applications Pregnancy And Lactation Text: This medication is Pregnancy Category C and has an unknown safety profile during pregnancy. It is unknown if this topical medication is excreted in breast milk.
Dapsone Counseling: I discussed with the patient the risks of dapsone including but not limited to hemolytic anemia, agranulocytosis, rashes, methemoglobinemia, kidney failure, peripheral neuropathy, headaches, GI upset, and liver toxicity.  Patients who start dapsone require monitoring including baseline LFTs and weekly CBCs for the first month, then every month thereafter.  The patient verbalized understanding of the proper use and possible adverse effects of dapsone.  All of the patient's questions and concerns were addressed.
High Dose Vitamin A Pregnancy And Lactation Text: High dose vitamin A therapy is contraindicated during pregnancy and breast feeding.
Tetracycline Counseling: Patient counseled regarding possible photosensitivity and increased risk for sunburn.  Patient instructed to avoid sunlight, if possible.  When exposed to sunlight, patients should wear protective clothing, sunglasses, and sunscreen.  The patient was instructed to call the office immediately if the following severe adverse effects occur:  hearing changes, easy bruising/bleeding, severe headache, or vision changes.  The patient verbalized understanding of the proper use and possible adverse effects of tetracycline.  All of the patient's questions and concerns were addressed. Patient understands to avoid pregnancy while on therapy due to potential birth defects.
Benzoyl Peroxide Pregnancy And Lactation Text: This medication is Pregnancy Category C. It is unknown if benzoyl peroxide is excreted in breast milk.
Spironolactone Pregnancy And Lactation Text: This medication can cause feminization of the male fetus and should be avoided during pregnancy. The active metabolite is also found in breast milk.
Isotretinoin Pregnancy And Lactation Text: This medication is Pregnancy Category X and is considered extremely dangerous during pregnancy. It is unknown if it is excreted in breast milk.
Tazorac Pregnancy And Lactation Text: This medication is not safe during pregnancy. It is unknown if this medication is excreted in breast milk.
Bactrim Counseling:  I discussed with the patient the risks of sulfa antibiotics including but not limited to GI upset, allergic reaction, drug rash, diarrhea, dizziness, photosensitivity, and yeast infections.  Rarely, more serious reactions can occur including but not limited to aplastic anemia, agranulocytosis, methemoglobinemia, blood dyscrasias, liver or kidney failure, lung infiltrates or desquamative/blistering drug rashes.
Topical Retinoid counseling:  Patient advised to apply a pea-sized amount only at bedtime and wait 30 minutes after washing their face before applying.  If too drying, patient may add a non-comedogenic moisturizer. The patient verbalized understanding of the proper use and possible adverse effects of retinoids.  All of the patient's questions and concerns were addressed.
Sarecycline Counseling: Patient advised regarding possible photosensitivity and discoloration of the teeth, skin, lips, tongue and gums.  Patient instructed to avoid sunlight, if possible.  When exposed to sunlight, patients should wear protective clothing, sunglasses, and sunscreen.  The patient was instructed to call the office immediately if the following severe adverse effects occur:  hearing changes, easy bruising/bleeding, severe headache, or vision changes.  The patient verbalized understanding of the proper use and possible adverse effects of sarecycline.  All of the patient's questions and concerns were addressed.
Birth Control Pills Pregnancy And Lactation Text: This medication should be avoided if pregnant and for the first 30 days post-partum.
Erythromycin Counseling:  I discussed with the patient the risks of erythromycin including but not limited to GI upset, allergic reaction, drug rash, diarrhea, increase in liver enzymes, and yeast infections.
Doxycycline Pregnancy And Lactation Text: This medication is Pregnancy Category D and not consider safe during pregnancy. It is also excreted in breast milk but is considered safe for shorter treatment courses.

## 2022-12-14 NOTE — PROCEDURE: PRESCRIPTION MEDICATION MANAGEMENT
Detail Level: Simple
Render In Strict Bullet Format?: No
Continue Regimen: Avelino ,Tretinoin
Initiate Treatment: Clinda combo

## 2022-12-14 NOTE — PROCEDURE: ADDITIONAL NOTES
Additional Notes: Discussed starting Accutane, patient will consider and call back to make appointment
Render Risk Assessment In Note?: no
Detail Level: Zone

## 2023-01-10 RX ORDER — PANTOPRAZOLE SODIUM 40 MG/1
TABLET, DELAYED RELEASE ORAL
Qty: 180 TABLET | Refills: 1 | Status: SHIPPED | OUTPATIENT
Start: 2023-01-10

## 2023-04-05 ENCOUNTER — OFFICE VISIT (OUTPATIENT)
Dept: FAMILY MEDICINE CLINIC | Facility: CLINIC | Age: 23
End: 2023-04-05

## 2023-04-05 ENCOUNTER — LAB ENCOUNTER (OUTPATIENT)
Dept: LAB | Age: 23
End: 2023-04-05
Attending: FAMILY MEDICINE
Payer: COMMERCIAL

## 2023-04-05 VITALS
SYSTOLIC BLOOD PRESSURE: 125 MMHG | HEART RATE: 65 BPM | BODY MASS INDEX: 26.09 KG/M2 | HEIGHT: 71 IN | DIASTOLIC BLOOD PRESSURE: 79 MMHG | WEIGHT: 186.38 LBS

## 2023-04-05 DIAGNOSIS — R10.9 ABDOMINAL CRAMPING: ICD-10-CM

## 2023-04-05 DIAGNOSIS — R42 DIZZINESS: ICD-10-CM

## 2023-04-05 DIAGNOSIS — Z11.8 SCREENING FOR CHLAMYDIAL DISEASE: ICD-10-CM

## 2023-04-05 DIAGNOSIS — F41.9 ANXIETY: ICD-10-CM

## 2023-04-05 DIAGNOSIS — M35.7 HYPERMOBILITY SYNDROME: Primary | ICD-10-CM

## 2023-04-05 DIAGNOSIS — K21.9 GASTROESOPHAGEAL REFLUX DISEASE WITHOUT ESOPHAGITIS: ICD-10-CM

## 2023-04-05 DIAGNOSIS — L70.9 ACNE, UNSPECIFIED ACNE TYPE: ICD-10-CM

## 2023-04-05 PROCEDURE — 3078F DIAST BP <80 MM HG: CPT | Performed by: FAMILY MEDICINE

## 2023-04-05 PROCEDURE — 82785 ASSAY OF IGE: CPT | Performed by: FAMILY MEDICINE

## 2023-04-05 PROCEDURE — 3074F SYST BP LT 130 MM HG: CPT | Performed by: FAMILY MEDICINE

## 2023-04-05 PROCEDURE — 86003 ALLG SPEC IGE CRUDE XTRC EA: CPT | Performed by: FAMILY MEDICINE

## 2023-04-05 PROCEDURE — 87491 CHLMYD TRACH DNA AMP PROBE: CPT | Performed by: FAMILY MEDICINE

## 2023-04-05 PROCEDURE — 87591 N.GONORRHOEAE DNA AMP PROB: CPT | Performed by: FAMILY MEDICINE

## 2023-04-05 PROCEDURE — 99214 OFFICE O/P EST MOD 30 MIN: CPT | Performed by: FAMILY MEDICINE

## 2023-04-05 PROCEDURE — 3008F BODY MASS INDEX DOCD: CPT | Performed by: FAMILY MEDICINE

## 2023-04-05 PROCEDURE — 36415 COLL VENOUS BLD VENIPUNCTURE: CPT | Performed by: FAMILY MEDICINE

## 2023-04-05 RX ORDER — ESCITALOPRAM OXALATE 10 MG/1
10 TABLET ORAL DAILY
Qty: 30 TABLET | Refills: 1 | Status: SHIPPED | OUTPATIENT
Start: 2023-04-05 | End: 2024-03-30

## 2023-04-05 RX ORDER — PANTOPRAZOLE SODIUM 40 MG/1
40 TABLET, DELAYED RELEASE ORAL
Qty: 180 TABLET | Refills: 4 | Status: SHIPPED | OUTPATIENT
Start: 2023-04-05

## 2023-04-05 RX ORDER — NORETHINDRONE ACETATE AND ETHINYL ESTRADIOL 1.5-30(21)
1 KIT ORAL DAILY
Qty: 84 TABLET | Refills: 5 | Status: SHIPPED | OUTPATIENT
Start: 2023-04-05

## 2023-04-05 RX ORDER — ONDANSETRON 4 MG/1
4 TABLET, ORALLY DISINTEGRATING ORAL EVERY 8 HOURS PRN
Qty: 30 TABLET | Refills: 1 | Status: SHIPPED | OUTPATIENT
Start: 2023-04-05

## 2023-04-06 LAB
C TRACH DNA SPEC QL NAA+PROBE: NEGATIVE
N GONORRHOEA DNA SPEC QL NAA+PROBE: NEGATIVE

## 2023-04-07 LAB
CLAM IGE QN: <0.1 KUA/L (ref ?–0.1)
CODFISH IGE QN: <0.1 KUA/L (ref ?–0.1)
CORN IGE QN: <0.1 KUA/L (ref ?–0.1)
COW MILK IGE QN: <0.1 KUA/L (ref ?–0.1)
EGG WHITE IGE QN: <0.1 KUA/L (ref ?–0.1)
IGE SERPL-ACNC: 23.8 KU/L (ref 2–214)
PEANUT IGE QN: <0.1 KUA/L (ref ?–0.1)
SCALLOP IGE QN: <0.1 KUA/L (ref ?–0.1)
SESAME SEED IGE QN: <0.1 KUA/L (ref ?–0.1)
SHRIMP IGE QN: <0.1 KUA/L (ref ?–0.1)
SOYBEAN IGE QN: <0.1 KUA/L (ref ?–0.1)
WALNUT IGE QN: <0.1 KUA/L (ref ?–0.1)
WHEAT IGE QN: <0.1 KUA/L (ref ?–0.1)

## 2023-04-26 ENCOUNTER — APPOINTMENT (OUTPATIENT)
Dept: URBAN - METROPOLITAN AREA CLINIC 244 | Age: 23
Setting detail: DERMATOLOGY
End: 2023-05-04

## 2023-04-26 DIAGNOSIS — L70.0 ACNE VULGARIS: ICD-10-CM

## 2023-04-26 PROCEDURE — OTHER PRESCRIPTION MEDICATION MANAGEMENT: OTHER

## 2023-04-26 PROCEDURE — OTHER COUNSELING: OTHER

## 2023-04-26 PROCEDURE — OTHER PRESCRIPTION: OTHER

## 2023-04-26 PROCEDURE — 99214 OFFICE O/P EST MOD 30 MIN: CPT

## 2023-04-26 RX ORDER — TRETIONIN 0.25 MG/G
CREAM TOPICAL
Qty: 45 | Refills: 1 | Status: ERX

## 2023-04-26 RX ORDER — CLINDAMYCIN PHOSPHATE AND BENZOYL PEROXIDE 1 %-5 %
KIT TOPICAL
Qty: 50 | Refills: 4 | Status: ACTIVE

## 2023-04-26 RX ORDER — MINOCYCLINE HYDROCHLORIDE 100 MG/1
CAPSULE ORAL
Qty: 60 | Refills: 5 | Status: ERX

## 2023-04-26 ASSESSMENT — LOCATION DETAILED DESCRIPTION DERM: LOCATION DETAILED: LEFT INFERIOR CENTRAL MALAR CHEEK

## 2023-04-26 ASSESSMENT — LOCATION SIMPLE DESCRIPTION DERM: LOCATION SIMPLE: LEFT CHEEK

## 2023-04-26 ASSESSMENT — LOCATION ZONE DERM: LOCATION ZONE: FACE

## 2023-04-26 NOTE — PROCEDURE: PRESCRIPTION MEDICATION MANAGEMENT
Detail Level: Simple
Continue Regimen: Avelino ,Tretinoin, Clinda
Render In Strict Bullet Format?: No
Plan: Avelino prn for flares two weeks per flare

## 2023-04-26 NOTE — PROCEDURE: COUNSELING
Spironolactone Counseling: Patient advised regarding risks of diarrhea, abdominal pain, hyperkalemia, birth defects (for female patients), liver toxicity and renal toxicity. The patient may need blood work to monitor liver and kidney function and potassium levels while on therapy. The patient verbalized understanding of the proper use and possible adverse effects of spironolactone.  All of the patient's questions and concerns were addressed.
Topical Clindamycin Counseling: Patient counseled that this medication may cause skin irritation or allergic reactions.  In the event of skin irritation, the patient was advised to reduce the amount of the drug applied or use it less frequently.   The patient verbalized understanding of the proper use and possible adverse effects of clindamycin.  All of the patient's questions and concerns were addressed.
Isotretinoin Pregnancy And Lactation Text: This medication is Pregnancy Category X and is considered extremely dangerous during pregnancy. It is unknown if it is excreted in breast milk.
Winlevi Pregnancy And Lactation Text: This medication is considered safe during pregnancy and breastfeeding.
Tazorac Counseling:  Patient advised that medication is irritating and drying.  Patient may need to apply sparingly and wash off after an hour before eventually leaving it on overnight.  The patient verbalized understanding of the proper use and possible adverse effects of tazorac.  All of the patient's questions and concerns were addressed.
Azelaic Acid Counseling: Patient counseled that medicine may cause skin irritation and to avoid applying near the eyes.  In the event of skin irritation, the patient was advised to reduce the amount of the drug applied or use it less frequently.   The patient verbalized understanding of the proper use and possible adverse effects of azelaic acid.  All of the patient's questions and concerns were addressed.
Winlevi Counseling:  I discussed with the patient the risks of topical clascoterone including but not limited to erythema, scaling, itching, and stinging. Patient voiced their understanding.
Bactrim Counseling:  I discussed with the patient the risks of sulfa antibiotics including but not limited to GI upset, allergic reaction, drug rash, diarrhea, dizziness, photosensitivity, and yeast infections.  Rarely, more serious reactions can occur including but not limited to aplastic anemia, agranulocytosis, methemoglobinemia, blood dyscrasias, liver or kidney failure, lung infiltrates or desquamative/blistering drug rashes.
Sarecycline Counseling: Patient advised regarding possible photosensitivity and discoloration of the teeth, skin, lips, tongue and gums.  Patient instructed to avoid sunlight, if possible.  When exposed to sunlight, patients should wear protective clothing, sunglasses, and sunscreen.  The patient was instructed to call the office immediately if the following severe adverse effects occur:  hearing changes, easy bruising/bleeding, severe headache, or vision changes.  The patient verbalized understanding of the proper use and possible adverse effects of sarecycline.  All of the patient's questions and concerns were addressed.
Tazorac Pregnancy And Lactation Text: This medication is not safe during pregnancy. It is unknown if this medication is excreted in breast milk.
Azithromycin Counseling:  I discussed with the patient the risks of azithromycin including but not limited to GI upset, allergic reaction, drug rash, diarrhea, and yeast infections.
Doxycycline Counseling:  Patient counseled regarding possible photosensitivity and increased risk for sunburn.  Patient instructed to avoid sunlight, if possible.  When exposed to sunlight, patients should wear protective clothing, sunglasses, and sunscreen.  The patient was instructed to call the office immediately if the following severe adverse effects occur:  hearing changes, easy bruising/bleeding, severe headache, or vision changes.  The patient verbalized understanding of the proper use and possible adverse effects of doxycycline.  All of the patient's questions and concerns were addressed.
Detail Level: Zone
High Dose Vitamin A Counseling: Side effects reviewed, pt to contact office should one occur.
Doxycycline Pregnancy And Lactation Text: This medication is Pregnancy Category D and not consider safe during pregnancy. It is also excreted in breast milk but is considered safe for shorter treatment courses.
Tetracycline Pregnancy And Lactation Text: This medication is Pregnancy Category D and not consider safe during pregnancy. It is also excreted in breast milk.
Birth Control Pills Pregnancy And Lactation Text: This medication should be avoided if pregnant and for the first 30 days post-partum.
Dapsone Counseling: I discussed with the patient the risks of dapsone including but not limited to hemolytic anemia, agranulocytosis, rashes, methemoglobinemia, kidney failure, peripheral neuropathy, headaches, GI upset, and liver toxicity.  Patients who start dapsone require monitoring including baseline LFTs and weekly CBCs for the first month, then every month thereafter.  The patient verbalized understanding of the proper use and possible adverse effects of dapsone.  All of the patient's questions and concerns were addressed.
Azithromycin Pregnancy And Lactation Text: This medication is considered safe during pregnancy and is also secreted in breast milk.
Benzoyl Peroxide Pregnancy And Lactation Text: This medication is Pregnancy Category C. It is unknown if benzoyl peroxide is excreted in breast milk.
Aklief counseling:  Patient advised to apply a pea-sized amount only at bedtime and wait 30 minutes after washing their face before applying.  If too drying, patient may add a non-comedogenic moisturizer.  The most commonly reported side effects including irritation, redness, scaling, dryness, stinging, burning, itching, and increased risk of sunburn.  The patient verbalized understanding of the proper use and possible adverse effects of retinoids.  All of the patient's questions and concerns were addressed.
Azelaic Acid Pregnancy And Lactation Text: This medication is considered safe during pregnancy and breast feeding.
Isotretinoin Counseling: Patient should get monthly blood tests, not donate blood, not drive at night if vision affected, not share medication, and not undergo elective surgery for 6 months after tx completed. Side effects reviewed, pt to contact office should one occur.
Bactrim Pregnancy And Lactation Text: This medication is Pregnancy Category D and is known to cause fetal risk.  It is also excreted in breast milk.
Erythromycin Counseling:  I discussed with the patient the risks of erythromycin including but not limited to GI upset, allergic reaction, drug rash, diarrhea, increase in liver enzymes, and yeast infections.
High Dose Vitamin A Pregnancy And Lactation Text: High dose vitamin A therapy is contraindicated during pregnancy and breast feeding.
Minocycline Counseling: Patient advised regarding possible photosensitivity and discoloration of the teeth, skin, lips, tongue and gums.  Patient instructed to avoid sunlight, if possible.  When exposed to sunlight, patients should wear protective clothing, sunglasses, and sunscreen.  The patient was instructed to call the office immediately if the following severe adverse effects occur:  hearing changes, easy bruising/bleeding, severe headache, or vision changes.  The patient verbalized understanding of the proper use and possible adverse effects of minocycline.  All of the patient's questions and concerns were addressed.
Use Enhanced Medication Counseling?: No
Topical Sulfur Applications Pregnancy And Lactation Text: This medication is Pregnancy Category C and has an unknown safety profile during pregnancy. It is unknown if this topical medication is excreted in breast milk.
Aklief Pregnancy And Lactation Text: It is unknown if this medication is safe to use during pregnancy.  It is unknown if this medication is excreted in breast milk.  Breastfeeding women should use the topical cream on the smallest area of the skin for the shortest time needed while breastfeeding.  Do not apply to nipple and areola.
Topical Retinoid Pregnancy And Lactation Text: This medication is Pregnancy Category C. It is unknown if this medication is excreted in breast milk.
Dapsone Pregnancy And Lactation Text: This medication is Pregnancy Category C and is not considered safe during pregnancy or breast feeding.
Tetracycline Counseling: Patient counseled regarding possible photosensitivity and increased risk for sunburn.  Patient instructed to avoid sunlight, if possible.  When exposed to sunlight, patients should wear protective clothing, sunglasses, and sunscreen.  The patient was instructed to call the office immediately if the following severe adverse effects occur:  hearing changes, easy bruising/bleeding, severe headache, or vision changes.  The patient verbalized understanding of the proper use and possible adverse effects of tetracycline.  All of the patient's questions and concerns were addressed. Patient understands to avoid pregnancy while on therapy due to potential birth defects.
Spironolactone Pregnancy And Lactation Text: This medication can cause feminization of the male fetus and should be avoided during pregnancy. The active metabolite is also found in breast milk.
Benzoyl Peroxide Counseling: Patient counseled that medicine may cause skin irritation and bleach clothing.  In the event of skin irritation, the patient was advised to reduce the amount of the drug applied or use it less frequently.   The patient verbalized understanding of the proper use and possible adverse effects of benzoyl peroxide.  All of the patient's questions and concerns were addressed.
Topical Sulfur Applications Counseling: Topical Sulfur Counseling: Patient counseled that this medication may cause skin irritation or allergic reactions.  In the event of skin irritation, the patient was advised to reduce the amount of the drug applied or use it less frequently.   The patient verbalized understanding of the proper use and possible adverse effects of topical sulfur application.  All of the patient's questions and concerns were addressed.
Erythromycin Pregnancy And Lactation Text: This medication is Pregnancy Category B and is considered safe during pregnancy. It is also excreted in breast milk.
Topical Clindamycin Pregnancy And Lactation Text: This medication is Pregnancy Category B and is considered safe during pregnancy. It is unknown if it is excreted in breast milk.
Birth Control Pills Counseling: Birth Control Pill Counseling: I discussed with the patient the potential side effects of OCPs including but not limited to increased risk of stroke, heart attack, thrombophlebitis, deep venous thrombosis, hepatic adenomas, breast changes, GI upset, headaches, and depression.  The patient verbalized understanding of the proper use and possible adverse effects of OCPs. All of the patient's questions and concerns were addressed.
Topical Retinoid counseling:  Patient advised to apply a pea-sized amount only at bedtime and wait 30 minutes after washing their face before applying.  If too drying, patient may add a non-comedogenic moisturizer. The patient verbalized understanding of the proper use and possible adverse effects of retinoids.  All of the patient's questions and concerns were addressed.

## 2023-05-26 NOTE — TELEPHONE ENCOUNTER
Please review. Protocol Failed or has No Protocol.   No Pap on file    Requested Prescriptions   Pending Prescriptions Disp Refills    MICROGESTIN FE 1.5/30 1.5-30 MG-MCG Oral Tab [Pharmacy Med Name: Ryan Mckenzie 1.5/30 FE TABLETS 28S] 84 tablet 5     Sig: TAKE 1 TABLET BY MOUTH DAILY       Gynecology Medication Protocol Failed - 5/26/2023 12:01 PM        Failed - Pass dependent on manual look-up of last PAP and patient compliance with PAP follow up recommendations        Passed - In person appointment or virtual visit in the past 12 mos or appointment in next 3 mos     Recent Outpatient Visits              1 month ago Hypermobility syndrome    Marcos Zaragoza MD    Office Visit    8 months ago Gastroesophageal reflux disease without esophagitis    Marcos Zaragoza MD    Office Visit    1 year ago Acne, unspecified acne type    Marcos Zaragoza MD    Office Visit    1 year ago Gastroesophageal reflux disease, unspecified whether esophagitis present    [facility], [address] [place] [address], AbhayBrandie post APRN    Office Visit    1 year ago Abdominal cramping    Marcos Zaragoza MD    Office Visit                               Recent Outpatient Visits              1 month ago Hypermobility syndrome    Marcos Zaragoza MD    Office Visit    8 months ago Gastroesophageal reflux disease without esophagitis    Marcos Zaragoza MD    Office Visit    1 year ago Acne, unspecified acne type    Marcos Zaragoza MD    Office Visit    1 year ago Gastroesophageal reflux disease, unspecified whether esophagitis present    [facility], [address] [place] [address], Vannesa Blank APRN    Office Visit    1 year ago Abdominal cramping    Clayton Nova, Leon Matos MD    Office Visit

## 2023-05-29 RX ORDER — NORETHINDRONE ACETATE AND ETHINYL ESTRADIOL 1.5-30(21)
1 KIT ORAL DAILY
Qty: 84 TABLET | Refills: 5 | Status: SHIPPED | OUTPATIENT
Start: 2023-05-29

## 2023-06-14 ENCOUNTER — TELEPHONE (OUTPATIENT)
Dept: FAMILY MEDICINE CLINIC | Facility: CLINIC | Age: 23
End: 2023-06-14

## 2023-06-14 ENCOUNTER — PATIENT MESSAGE (OUTPATIENT)
Dept: FAMILY MEDICINE CLINIC | Facility: CLINIC | Age: 23
End: 2023-06-14

## 2023-06-14 NOTE — TELEPHONE ENCOUNTER
Left message to call back regarding referrals. Patient can go to [facility] physical therapy location. The genetic counselor referred with the only one that takes HMO plans.     To schedule Physical Therapy at any of the [facility] facilities, please call [phone]

## 2023-06-14 NOTE — TELEPHONE ENCOUNTER
Patient returned call. Patients full name and  verified. Provided message below. Patient verbalized understanding.

## 2023-06-14 NOTE — TELEPHONE ENCOUNTER
Patient mother called in regards to scheduling a follow up appointment for they are still having issues with the referrals for either they are scheduled out till very far or they do not accept the patient insurance patient mother is requesting for either in person or video visit requesting for soonest available to get the ball rolling with the referrals as soon as possible   Stated that the patient leaves for college during the last week of college

## 2023-06-15 NOTE — TELEPHONE ENCOUNTER
From: Helga Mcclain  To: Jonas Guerrero MD  Sent: 6/14/2023 4:49 PM CDT  Subject: Documentation for [employer] [facility] housing    Hi ,   Im attending [employer] in the fall and in the process of getting assigned for housing. As you know, my stomach issues are pretty prominent and painful to deal with so I am currently applying for accommodations in housing to allow me to have a single dorm room close to my classes. Doing so would allow me to have somewhere private and quiet to go when my stomach problems flare up to an unmanageable point, which happens around twice-3 times a week now. Since the thing that calms my stomach from the pain and nausea is usually being able to lay down undisturbed and letting my heart rate slow down and my acid reflux to settle before it comes up, I think the single room would be a good way to manage my symptoms comfortably in private while Im away. If at all possible I have a documentation form that I need filled out by a provider basically describing the problems Carin Jacobo been dealing with to allow the housing department to process my request, and I would really appreciate it if you would be able to   fill it out. Please let me know if you would be able to do that, or where I would need to send the document in order to have it filled out.  Thank you so much for your time and I hope to hear back soon,   Saima Mortensen

## 2023-06-22 ENCOUNTER — MED REC SCAN ONLY (OUTPATIENT)
Dept: FAMILY MEDICINE CLINIC | Facility: CLINIC | Age: 23
End: 2023-06-22

## 2023-06-22 NOTE — TELEPHONE ENCOUNTER
Sent student disability form to [employer] fax# [phone]. Confirmation rec'd    Left detailed message informing patient the form was signed and faxed to university. A copy is ready for pickup at [facility] clinic.

## 2023-07-12 ENCOUNTER — TELEPHONE (OUTPATIENT)
Dept: PHYSICAL THERAPY | Facility: HOSPITAL | Age: 23
End: 2023-07-12

## 2023-07-13 ENCOUNTER — OFFICE VISIT (OUTPATIENT)
Dept: PHYSICAL THERAPY | Age: 23
End: 2023-07-13
Attending: FAMILY MEDICINE
Payer: COMMERCIAL

## 2023-07-13 DIAGNOSIS — M35.7 HYPERMOBILITY SYNDROME: Primary | ICD-10-CM

## 2023-07-13 PROCEDURE — 97110 THERAPEUTIC EXERCISES: CPT | Performed by: PHYSICAL THERAPIST

## 2023-07-13 PROCEDURE — 97161 PT EVAL LOW COMPLEX 20 MIN: CPT | Performed by: PHYSICAL THERAPIST

## 2023-07-18 ENCOUNTER — OFFICE VISIT (OUTPATIENT)
Dept: PHYSICAL THERAPY | Age: 23
End: 2023-07-18
Attending: FAMILY MEDICINE
Payer: COMMERCIAL

## 2023-07-18 PROCEDURE — 97112 NEUROMUSCULAR REEDUCATION: CPT | Performed by: PHYSICAL THERAPIST

## 2023-07-18 PROCEDURE — 97110 THERAPEUTIC EXERCISES: CPT | Performed by: PHYSICAL THERAPIST

## 2023-07-18 NOTE — PROGRESS NOTES
Diagnosis: Hypermobility syndrome (M35.7)           Next MD visit: none scheduled  Fall Risk: standard         Precautions: n/a          Medication Changes since last visit?: No      Subjective: Patient states she has not been unable to workout for the past few days. Pt describes pain level 0/10 at this time. Objective:  ROM is WNL into all planes for B UE's, LE's, and trunk. Trunk strength grossly 5/5    Assessment: Patient did well with progression of therapeutic exercises. No complaints of pain. Patient and PT goals are in progress. Plan: Continue PT. Progress therapeutic exercises.      7/18/2023  Visit#: 2/12 until 9/30 [facility]   Thera Ex   X8min  - supine hamstring stretch  - reviewed HEP  - neuromuscular re-ed  - Shuttle LE extensions 6 bands 10 x 3  - Shuttle single LE extension   - instructed on additional HEP       Neuromuscular Re-education   X40min  - trunk stabilization and core strengthening to improve trunk control  - supine bridging on swiss ball 10 x 2  - supine swiss ball obliques 10 x 2  - supine deadbugs with 2lb db's 10 x 2  - prone modified planks (elbows / knees)15 sec hold x 10  - bird dog 10 x 2  - kneeling on BOSU cable shoulder rows, extensions 10# 10x  2  - kneeling pallof press with yellow theraband  - alternate forward lunges on BOSU with 5lb db's 10 x 2  - side lunges on BOSU with 5lb db's 10 x 2     Modalities                      Charges: EX1, Neuro Re-ed3    Total Timed Treatment: 48 min  Total Treatment Time: 50 min

## 2023-07-21 ENCOUNTER — OFFICE VISIT (OUTPATIENT)
Dept: PHYSICAL THERAPY | Age: 23
End: 2023-07-21
Attending: FAMILY MEDICINE
Payer: COMMERCIAL

## 2023-07-21 PROCEDURE — 97110 THERAPEUTIC EXERCISES: CPT | Performed by: PHYSICAL THERAPIST

## 2023-07-21 PROCEDURE — 97112 NEUROMUSCULAR REEDUCATION: CPT | Performed by: PHYSICAL THERAPIST

## 2023-07-21 NOTE — PROGRESS NOTES
Diagnosis: Hypermobility syndrome (M35.7)           Next MD visit: none scheduled  Fall Risk: standard         Precautions: n/a          Medication Changes since last visit?: No      Subjective: Patient reports some post-exercise soreness after last therapy session. Pt describes pain level 0/10 at this time. Objective:  ROM is WNL into all planes for B UE's, LE's, and trunk. Trunk strength grossly 5/5    Assessment: Patient did well with progression of therapeutic exercises. No complaints of pain. Patient and PT goals are in progress. Plan: Continue PT. Progress therapeutic exercises.      7/21/2023  Visit#: 3/12 until 9/30 [facility] 7/18/2023  Visit#: 2/12 until 9/30 [facility]   Thera Ex   X10min  - treadmill brisk walking  - Shuttle B LE extensions 6 bands 10 x 3  - shuttle single LE extension  - B shoulder horizontal abduction with red sportcord resistance 10 x 2  - neuromuscular re-ed X8min  - supine hamstring stretch  - reviewed HEP  - neuromuscular re-ed  - Shuttle LE extensions 6 bands 10 x 3  - Shuttle single LE extension   - instructed on additional HEP       Neuromuscular Re-education   X38min  - trunk stabilization and core strengthening to improve trunk control  - supine deadbugs with 2lb db's 10 x 2  - prone modified planks (elbows / knees)15 sec hold x 10  - quadruped trunk rotation arm lifts with 1lb wt 20x  - bird dog 10 x 2  - kneeling on BOSU cable shoulder rows, extensions 10# 10x  2  - kneeling trunk rotation with red sportcord  - B UE overhead press with red sportcord pull 20x  - alternate forward lunges on BOSU with 5lb db's 10 x 2  - side lunges on BOSU with 5lb db's 10 x 2   X40min  - trunk stabilization and core strengthening to improve trunk control  - supine bridging on swiss ball 10 x 2  - supine swiss ball obliques 10 x 2  - supine deadbugs with 2lb db's 10 x 2  - prone modified planks (elbows / knees)15 sec hold x 10  - bird dog 10 x 2  - kneeling on BOSU cable shoulder rows, extensions 10# 10x  2  - kneeling pallof press with yellow theraband  - alternate forward lunges on BOSU with 5lb db's 10 x 2  - side lunges on BOSU with 5lb db's 10 x 2     Modalities                         Charges: EX1, Neuro Re-ed3    Total Timed Treatment: 48 min  Total Treatment Time: 48 min

## 2023-07-24 ENCOUNTER — OFFICE VISIT (OUTPATIENT)
Dept: PHYSICAL THERAPY | Age: 23
End: 2023-07-24
Attending: FAMILY MEDICINE
Payer: COMMERCIAL

## 2023-07-24 PROCEDURE — 97112 NEUROMUSCULAR REEDUCATION: CPT | Performed by: PHYSICAL THERAPIST

## 2023-07-24 PROCEDURE — 97110 THERAPEUTIC EXERCISES: CPT | Performed by: PHYSICAL THERAPIST

## 2023-07-24 NOTE — PROGRESS NOTES
Diagnosis: Hypermobility syndrome (M35.7)           Next MD visit: none scheduled  Fall Risk: standard         Precautions: n/a          Medication Changes since last visit?: No      Subjective: Patient reports some post-exercise soreness after last therapy session. Pt describes pain level 0/10 at this time. Objective:  ROM is WNL into all planes for B UE's, LE's, and trunk. Trunk strength grossly 5/5    Assessment: Patient continues to do well with progression of therapeutic exercises. Some shoulder pain complaints during planks and is relieved by rest. Patient and PT goals are in progress. Plan: Continue PT. Progress therapeutic exercises.      7/24/2023  Visit#: 4/12 until 9/30 [facility] 7/21/2023  Visit#: 3/12 until 9/30 [facility] 7/18/2023  Visit#: 2/12 until 9/30 [facility]   Thera Ex   X10min  - neuromuscular re-ed  - shoulder rows on multi-pull 10# 10 x 2  - trunk high-low rotation on multi-pull 10# 10 x 2  - Shuttle B LE extensions 6 bands 10 x 3  - shuttle single LE extension     X10min  - treadmill brisk walking  - Shuttle B LE extensions 6 bands 10 x 3  - shuttle single LE extension  - B shoulder horizontal abduction with red sportcord resistance 10 x 2  - neuromuscular re-ed X8min  - supine hamstring stretch  - reviewed HEP  - neuromuscular re-ed  - Shuttle LE extensions 6 bands 10 x 3  - Shuttle single LE extension   - instructed on additional HEP       Neuromuscular Re-education   x38min  - trunk stabilization and core strengthening to improve trunk control  - prone modified planks (elbows / knees)15 sec hold x 10  - supine deadbugs with 3lb db's 10 x 2  - swiss ball bridging 10 x 5 sec hold  - quadruped trunk rotation arm lifts with 2lb wt 20x  - bird dog 10 x 2  - seated swiss ball forward press, overhead press with 4lb db's 10x  - alternate forward lunges on BOSU with 4lb db's 10 x 2  - side lunges on BOSU with 4lb db's 10 x 2  - inverted BOSU squats 10 x 2   X38min  - trunk stabilization and core strengthening to improve trunk control  - supine deadbugs with 2lb db's 10 x 2  - prone modified planks (elbows / knees)15 sec hold x 10  - quadruped trunk rotation arm lifts with 1lb wt 20x  - bird dog 10 x 2  - kneeling on BOSU cable shoulder rows, extensions 10# 10x  2  - kneeling trunk rotation with red sportcord  - B UE overhead press with red sportcord pull 20x  - alternate forward lunges on BOSU with 5lb db's 10 x 2  - side lunges on BOSU with 5lb db's 10 x 2   X40min  - trunk stabilization and core strengthening to improve trunk control  - supine bridging on swiss ball 10 x 2  - supine swiss ball obliques 10 x 2  - supine deadbugs with 2lb db's 10 x 2  - prone modified planks (elbows / knees)15 sec hold x 10  - bird dog 10 x 2  - kneeling on BOSU cable shoulder rows, extensions 10# 10x  2  - kneeling pallof press with yellow theraband  - alternate forward lunges on BOSU with 5lb db's 10 x 2  - side lunges on BOSU with 5lb db's 10 x 2     Modalities                            Charges: EX1, Neuro Re-ed3    Total Timed Treatment: 48 min  Total Treatment Time: 48 min

## 2023-07-27 ENCOUNTER — OFFICE VISIT (OUTPATIENT)
Dept: PHYSICAL THERAPY | Age: 23
End: 2023-07-27
Attending: FAMILY MEDICINE
Payer: COMMERCIAL

## 2023-07-27 PROCEDURE — 97110 THERAPEUTIC EXERCISES: CPT | Performed by: PHYSICAL THERAPIST

## 2023-07-27 PROCEDURE — 97112 NEUROMUSCULAR REEDUCATION: CPT | Performed by: PHYSICAL THERAPIST

## 2023-07-27 NOTE — PROGRESS NOTES
Diagnosis: Hypermobility syndrome (M35.7)           Next MD visit: none scheduled  Fall Risk: standard         Precautions: n/a          Medication Changes since last visit?: No      Subjective: Patient reports some post-exercise soreness after last therapy session. Pt describes pain level 0/10 at this time. Objective:  Trunk ROM is WFL into all planes      Assessment: Patient continues to do well with progression of therapeutic exercises. Some shoulder pain complaints during planks and is relieved by rest. Patient and PT goals are in progress. Plan: Continue PT. Progress therapeutic exercises.      7/27/2023  Visit#: 5/12 until 9/30 [facility] 7/24/2023  Visit#: 4/12 until 9/30 [facility] 7/21/2023  Visit#: 3/12 until 9/30 [facility] 7/18/2023  Visit#: 2/12 until 9/30 [facility]   Thera Ex   X25min  - elliptical x 5min  - neuromuscular re-ed   - prone press ups  - repeat standing trunk extensions 10x  - shoulder rows on multi-pull 10# 10 x 2  - trunk high-low rotation on multi-pull 10# 10 x 2  - Shuttle B LE extensions 5 bands 10 x 3  - Shuttle single LE extension 3 bands 10 x 2  - forward step up on BOSU 10 x 2  - prone trunk extensions 10 x      X10min  - neuromuscular re-ed  - shoulder rows on multi-pull 10# 10 x 2  - trunk high-low rotation on multi-pull 10# 10 x 2  - Shuttle B LE extensions 6 bands 10 x 3  - shuttle single LE extension     X10min  - treadmill brisk walking  - Shuttle B LE extensions 6 bands 10 x 3  - shuttle single LE extension  - B shoulder horizontal abduction with red sportcord resistance 10 x 2  - neuromuscular re-ed X8min  - supine hamstring stretch  - reviewed HEP  - neuromuscular re-ed  - Shuttle LE extensions 6 bands 10 x 3  - Shuttle single LE extension   - instructed on additional HEP       Neuromuscular Re-education   X20min - trunk stabilization and core strengthening to improve trunk control  - supine deabdugs with 3lb db's 10x  2  - swiss ball bridging 20 x 5 sec hold  - quadruped trunk rotation arm lifts without wt 20x  - bird dog 10 x 2  - seated swiss ball forward press, overhead press with 4lb db's 10x  - alternate forward lunges on BOSU with 5lb db's 10 x 2  - side lunges on BOSU with 5lb db's 10 x 2   x38min  - trunk stabilization and core strengthening to improve trunk control  - prone modified planks (elbows / knees)15 sec hold x 10  - supine deadbugs with 3lb db's 10 x 2  - swiss ball bridging 10 x 5 sec hold  - quadruped trunk rotation arm lifts with 2lb wt 20x  - bird dog 10 x 2  - seated swiss ball forward press, overhead press with 4lb db's 10x  - alternate forward lunges on BOSU with 4lb db's 10 x 2  - side lunges on BOSU with 4lb db's 10 x 2  - inverted BOSU squats 10 x 2   X38min  - trunk stabilization and core strengthening to improve trunk control  - supine deadbugs with 2lb db's 10 x 2  - prone modified planks (elbows / knees)15 sec hold x 10  - quadruped trunk rotation arm lifts with 1lb wt 20x  - bird dog 10 x 2  - kneeling on BOSU cable shoulder rows, extensions 10# 10x  2  - kneeling trunk rotation with red sportcord  - B UE overhead press with red sportcord pull 20x  - alternate forward lunges on BOSU with 5lb db's 10 x 2  - side lunges on BOSU with 5lb db's 10 x 2   X40min  - trunk stabilization and core strengthening to improve trunk control  - supine bridging on swiss ball 10 x 2  - supine swiss ball obliques 10 x 2  - supine deadbugs with 2lb db's 10 x 2  - prone modified planks (elbows / knees)15 sec hold x 10  - bird dog 10 x 2  - kneeling on BOSU cable shoulder rows, extensions 10# 10x  2  - kneeling pallof press with yellow theraband  - alternate forward lunges on BOSU with 5lb db's 10 x 2  - side lunges on BOSU with 5lb db's 10 x 2     Modalities                               Charges: EX2, Neuro Re-ed1    Total Timed Treatment: 45 min  Total Treatment Time: 45 min

## 2023-08-06 RX ORDER — NORETHINDRONE ACETATE AND ETHINYL ESTRADIOL 1.5-30(21)
1 KIT ORAL DAILY
Qty: 84 TABLET | Refills: 5 | OUTPATIENT
Start: 2023-08-06

## 2023-08-07 RX ORDER — ONDANSETRON 4 MG/1
4 TABLET, ORALLY DISINTEGRATING ORAL EVERY 8 HOURS PRN
Qty: 30 TABLET | Refills: 1 | Status: SHIPPED | OUTPATIENT
Start: 2023-08-07

## 2023-08-07 NOTE — TELEPHONE ENCOUNTER
Please review. Protocol failed / No Protocol. Requested Prescriptions   Pending Prescriptions Disp Refills    ondansetron 4 MG Oral Tablet Dispersible 30 tablet 1     Sig: Take 1 tablet (4 mg total) by mouth every 8 (eight) hours as needed for Nausea. There is no refill protocol information for this order       Norethin Ace-Eth Estrad-FE (MICROGESTIN FE 1.5/30) 1.5-30 MG-MCG Oral Tab 84 tablet 5     Sig: Take 1 tablet by mouth daily.        Gynecology Medication Protocol Failed - 8/5/2023  2:46 AM        Failed - Pass dependent on manual look-up of last PAP and patient compliance with PAP follow up recommendations        Passed - In person appointment or virtual visit in the past 12 mos or appointment in next 3 mos     Recent Outpatient Visits              1 week ago     [facility] in [place] [address]., Elizabeth Verdugo, PT    Office Visit    1 week ago     [facility] in [place] [address]., Elizabeth Verdugo, PT    Office Visit    2 weeks ago     [facility] in [place] [address]., Elizabeth Verdugo, PT    Office Visit    2 weeks ago     [facility] in [place] [address]., Elizabeth Verdugo, PT    Office Visit    3 weeks ago Hypermobility syndrome    [facility] in [place] [address]., Elizabeth Verdugo, PT    Office Visit          Future Appointments         Provider Department Appt Notes    In 3 days Lazaro Choi.Elizabeth [address] [facility]  no c/p, 60v pcy    In 1 week Lazaro Choi., Osvaldo [address] [facility]  no c/p, 60v pcy    In 1 week Lazaro Choi.Elizabeth PT [employer] in [address] [facility]  no c/p, 60v pcy    In 2 weeks Lazarodo Choi.Elizabeth PT [employer] in [address] [facility]  no c/p, 60v pcy    In 2 weeks Joanie Garcia MD [address] [facility] [place] Further discussion about current diagnostics, and other concerns for new possible conditions    In 2 weeks Jaime Rodrigues., Heriberto Ruvalcaba, PT [facility] in [facility]  no c/p, 60v pcy    In 3 weeks Heriberto Menchaca Jr., PT [facility] in [address] [facility]  no c/p, 60v pcy

## 2023-08-09 ENCOUNTER — OFFICE VISIT (OUTPATIENT)
Dept: PHYSICAL THERAPY | Age: 23
End: 2023-08-09
Attending: FAMILY MEDICINE
Payer: COMMERCIAL

## 2023-08-09 PROCEDURE — 97110 THERAPEUTIC EXERCISES: CPT | Performed by: PHYSICAL THERAPIST

## 2023-08-09 PROCEDURE — 97112 NEUROMUSCULAR REEDUCATION: CPT | Performed by: PHYSICAL THERAPIST

## 2023-08-09 NOTE — PROGRESS NOTES
Diagnosis: Hypermobility syndrome (M35.7)           Next MD visit: none scheduled  Fall Risk: standard         Precautions: n/a          Medication Changes since last visit?: No      Subjective: Patient reports R knee pain a few days ago. No pain at this time and doing well with exercises overall. Pt describes pain level 0/10 at this time. Objective:  Trunk ROM is WFL into all planes  B knee ROM is WFL into all planes      Assessment: Patient continues to do well with progression of therapeutic exercises. Patient and PT goals are in progress. Plan: Continue PT. Progress therapeutic exercises.      8/9/2023  Visit#: 6/12 until 9/30 [facility] 7/27/2023  Visit#: 5/12 until 9/30 [facility] 7/24/2023  Visit#: 4/12 until 9/30 [facility] 7/21/2023  Visit#: 3/12 until 9/30 [facility]   Thera Ex   X25min  - prone press ups  - supine hamstring stretch 10 x 5 sec hold  - neuromuscular re-ed  - Shuttle B LE extensions 5 bands 10x  3  - Shuttle single LE extensions 3 bands 10x  2  - Neuromuscular re-ed  - elliptical x 10min   X25min  - elliptical x 5min  - neuromuscular re-ed   - prone press ups  - repeat standing trunk extensions 10x  - shoulder rows on multi-pull 10# 10 x 2  - trunk high-low rotation on multi-pull 10# 10 x 2  - Shuttle B LE extensions 5 bands 10 x 3  - Shuttle single LE extension 3 bands 10 x 2  - forward step up on BOSU 10 x 2  - prone trunk extensions 10 x      X10min  - neuromuscular re-ed  - shoulder rows on multi-pull 10# 10 x 2  - trunk high-low rotation on multi-pull 10# 10 x 2  - Shuttle B LE extensions 6 bands 10 x 3  - shuttle single LE extension     X10min  - treadmill brisk walking  - Shuttle B LE extensions 6 bands 10 x 3  - shuttle single LE extension  - B shoulder horizontal abduction with red sportcord resistance 10 x 2  - neuromuscular re-ed   Neuromuscular Re-education   X25min  - trunk stabilization and core strengthening to improve trunk control  - supine deabdugs with 3lb db's 10x  2  - swiss ball bridging 20 x 5 sec hold  - quadruped trunk rotation arm lifts without wt 20x  - bird dog 10 x 2  - seated swiss ball forward press, overhead press with 4lb db's 10x  - seated swiss ball cable shoulder rows 20# 10 x 2  - alternate forward lunges on BOSU with 5lb db's 10 x 2  - side lunges on BOSU with 5lb db's 10 x 2  - single LE stance on BOSU 20 sec x 5 X20min - trunk stabilization and core strengthening to improve trunk control  - supine deabdugs with 3lb db's 10x  2  - swiss ball bridging 20 x 5 sec hold  - quadruped trunk rotation arm lifts without wt 20x  - bird dog 10 x 2  - seated swiss ball forward press, overhead press with 4lb db's 10x  - alternate forward lunges on BOSU with 5lb db's 10 x 2  - side lunges on BOSU with 5lb db's 10 x 2   x38min  - trunk stabilization and core strengthening to improve trunk control  - prone modified planks (elbows / knees)15 sec hold x 10  - supine deadbugs with 3lb db's 10 x 2  - swiss ball bridging 10 x 5 sec hold  - quadruped trunk rotation arm lifts with 2lb wt 20x  - bird dog 10 x 2  - seated swiss ball forward press, overhead press with 4lb db's 10x  - alternate forward lunges on BOSU with 4lb db's 10 x 2  - side lunges on BOSU with 4lb db's 10 x 2  - inverted BOSU squats 10 x 2   X38min  - trunk stabilization and core strengthening to improve trunk control  - supine deadbugs with 2lb db's 10 x 2  - prone modified planks (elbows / knees)15 sec hold x 10  - quadruped trunk rotation arm lifts with 1lb wt 20x  - bird dog 10 x 2  - kneeling on BOSU cable shoulder rows, extensions 10# 10x  2  - kneeling trunk rotation with red sportcord  - B UE overhead press with red sportcord pull 20x  - alternate forward lunges on BOSU with 5lb db's 10 x 2  - side lunges on BOSU with 5lb db's 10 x 2     Modalities                             Charges: EX2, Neuro Re-ed2   Total Timed Treatment: 50 min  Total Treatment Time: 50 min

## 2023-08-16 ENCOUNTER — OFFICE VISIT (OUTPATIENT)
Dept: PHYSICAL THERAPY | Age: 23
End: 2023-08-16
Attending: FAMILY MEDICINE
Payer: COMMERCIAL

## 2023-08-16 PROCEDURE — 97110 THERAPEUTIC EXERCISES: CPT | Performed by: PHYSICAL THERAPIST

## 2023-08-16 PROCEDURE — 97112 NEUROMUSCULAR REEDUCATION: CPT | Performed by: PHYSICAL THERAPIST

## 2023-08-16 NOTE — PROGRESS NOTES
Diagnosis: Hypermobility syndrome (M35.7)           Next MD visit: none scheduled  Fall Risk: standard         Precautions: n/a          Medication Changes since last visit?: No      Subjective: Patient still R knee pain a few days ago. No pain at this time and doing well with exercises overall. Pt describes pain level 0/10 at this time. Objective:  Trunk ROM is WFL into all planes  B knee ROM is WFL into all planes      Assessment: Demonstrates decreased LE stability during single LE squats. Tolerating progression of therapeutic exercises. Patient and PT goals are in progress. Plan: Continue PT. Progress therapeutic exercises.      8/16/2023  Visit#: 7/12 intil 9/30 [facility] 8/9/2023  Visit#: 6/12 until 9/30 [facility] 7/27/2023  Visit#: 5/12 until 9/30 [facility]   Thera Ex   X23min  - Shuttle B LE extensions 6 bands 10 x 3  - Shuttle single LE extension 4-5 bands 10 x 3  - green theraband resisted LE abduction 10 x 2  - neuromuscular re-ed   X25min  - prone press ups  - supine hamstring stretch 10 x 5 sec hold  - neuromuscular re-ed  - Shuttle B LE extensions 5 bands 10x  3  - Shuttle single LE extensions 3 bands 10x  2  - Neuromuscular re-ed  - elliptical x 10min   X25min  - elliptical x 5min  - neuromuscular re-ed   - prone press ups  - repeat standing trunk extensions 10x  - shoulder rows on multi-pull 10# 10 x 2  - trunk high-low rotation on multi-pull 10# 10 x 2  - Shuttle B LE extensions 5 bands 10 x 3  - Shuttle single LE extension 3 bands 10 x 2  - forward step up on BOSU 10 x 2  - prone trunk extensions 10 x        Neuromuscular Re-education   X25min  - bird dog with 5lb db's 10 x 2  - quadruped trunk rotation arm lifts with 3lb db 10 x 2  - standing on airex beam forward press, overhead press with 4lb db's 10x  - standing on airex beam cable shoulder rows, extensions 10-20# 10 x 2  - alternate forward lunges on BOSU with 5lb db's 10 x 2  - side lunges on BOSU with 5lb db's 10 x 2  - high step marching with 10lb farmer's carry   X25min  - trunk stabilization and core strengthening to improve trunk control  - supine deabdugs with 3lb db's 10x  2  - swiss ball bridging 20 x 5 sec hold  - quadruped trunk rotation arm lifts without wt 20x  - bird dog 10 x 2  - seated swiss ball forward press, overhead press with 4lb db's 10x  - seated swiss ball cable shoulder rows 20# 10 x 2  - alternate forward lunges on BOSU with 5lb db's 10 x 2  - side lunges on BOSU with 5lb db's 10 x 2  - single LE stance on BOSU 20 sec x 5   X20min - trunk stabilization and core strengthening to improve trunk control  - supine deabdugs with 3lb db's 10x  2  - swiss ball bridging 20 x 5 sec hold  - quadruped trunk rotation arm lifts without wt 20x  - bird dog 10 x 2  - seated swiss ball forward press, overhead press with 4lb db's 10x  - alternate forward lunges on BOSU with 5lb db's 10 x 2  - side lunges on BOSU with 5lb db's 10 x 2     Modalities                            Charges: EX2, Neuro Re-ed2   Total Timed Treatment: 48 min  Total Treatment Time: 50 min

## 2023-08-18 ENCOUNTER — OFFICE VISIT (OUTPATIENT)
Dept: PHYSICAL THERAPY | Age: 23
End: 2023-08-18
Attending: FAMILY MEDICINE
Payer: COMMERCIAL

## 2023-08-18 PROCEDURE — 97110 THERAPEUTIC EXERCISES: CPT | Performed by: PHYSICAL THERAPIST

## 2023-08-18 PROCEDURE — 97140 MANUAL THERAPY 1/> REGIONS: CPT | Performed by: PHYSICAL THERAPIST

## 2023-08-18 NOTE — PROGRESS NOTES
Diagnosis: Hypermobility syndrome (M35.7)           Next MD visit: none scheduled  Fall Risk: standard         Precautions: n/a          Medication Changes since last visit?: No      Subjective: Patient still with some on /off R knee pain. Pt describes pain level 0/10 at this time. Objective:  Trunk ROM is WFL into all planes  B knee ROM is WFL into all planes      Assessment: Demonstrates improved LE stability during single LE squats. No knee or back pain during therapeutic exercises. Patient and PT goals are in progress. Plan: Continue PT. Progress therapeutic exercises.      8/18/2023  Visit#: 8/12 until 9/30 [facility] 8/16/2023  Visit#: 7/12 intil 9/30 [facility] 8/9/2023  Visit#: 6/12 until 9/30 [facility] 7/27/2023  Visit#: 5/12 until 9/30 [facility]   Thera Ex   X23min  - Shuttle B LE extensions 6 bands 10 x 3  - Shuttle single LE extension 4-5 bands 10 x 3  - green theraband resisted LE abduction 10 x 3  - neuromuscular re-ed X23min  - Shuttle B LE extensions 6 bands 10 x 3  - Shuttle single LE extension 4-5 bands 10 x 3  - green theraband resisted LE abduction 10 x 2  - neuromuscular re-ed   X25min  - prone press ups  - supine hamstring stretch 10 x 5 sec hold  - neuromuscular re-ed  - Shuttle B LE extensions 5 bands 10x  3  - Shuttle single LE extensions 3 bands 10x  2  - Neuromuscular re-ed  - elliptical x 10min   X25min  - elliptical x 5min  - neuromuscular re-ed   - prone press ups  - repeat standing trunk extensions 10x  - shoulder rows on multi-pull 10# 10 x 2  - trunk high-low rotation on multi-pull 10# 10 x 2  - Shuttle B LE extensions 5 bands 10 x 3  - Shuttle single LE extension 3 bands 10 x 2  - forward step up on BOSU 10 x 2  - prone trunk extensions 10 x        Neuromuscular Re-education   X23min  - bird dog with 3lb db's 10 x 3  - quadruped trunk rotation arm lifts with 3lb db 10 x 2  - seated on swiss ball forward press, overhead press with 4lb db's 10x  - standing on airex beam cable shoulder rows, extensions 10-20# 10 x 2  - alternate forward lunges on BOSU with 5lb db's 10 x 2  - side lunges on BOSU with 5lb db's 10 x 2  - high step marching with 10lb farmer's carry X25min  - bird dog with 5lb db's 10 x 2  - quadruped trunk rotation arm lifts with 3lb db 10 x 2  - standing on airex beam forward press, overhead press with 4lb db's 10x  - standing on airex beam cable shoulder rows, extensions 10-20# 10 x 2  - alternate forward lunges on BOSU with 5lb db's 10 x 2  - side lunges on BOSU with 5lb db's 10 x 2  - high step marching with 10lb farmer's carry   X25min  - trunk stabilization and core strengthening to improve trunk control  - supine deabdugs with 3lb db's 10x  2  - swiss ball bridging 20 x 5 sec hold  - quadruped trunk rotation arm lifts without wt 20x  - bird dog 10 x 2  - seated swiss ball forward press, overhead press with 4lb db's 10x  - seated swiss ball cable shoulder rows 20# 10 x 2  - alternate forward lunges on BOSU with 5lb db's 10 x 2  - side lunges on BOSU with 5lb db's 10 x 2  - single LE stance on BOSU 20 sec x 5   X20min - trunk stabilization and core strengthening to improve trunk control  - supine deabdugs with 3lb db's 10x  2  - swiss ball bridging 20 x 5 sec hold  - quadruped trunk rotation arm lifts without wt 20x  - bird dog 10 x 2  - seated swiss ball forward press, overhead press with 4lb db's 10x  - alternate forward lunges on BOSU with 5lb db's 10 x 2  - side lunges on BOSU with 5lb db's 10 x 2     Modalities                               Charges: EX2, Neuro Re-ed2   Total Timed Treatment: 46 min  Total Treatment Time: 46 min

## 2023-08-21 ENCOUNTER — OFFICE VISIT (OUTPATIENT)
Dept: PHYSICAL THERAPY | Age: 23
End: 2023-08-21
Attending: FAMILY MEDICINE
Payer: COMMERCIAL

## 2023-08-21 PROCEDURE — 97112 NEUROMUSCULAR REEDUCATION: CPT | Performed by: PHYSICAL THERAPIST

## 2023-08-21 PROCEDURE — 97110 THERAPEUTIC EXERCISES: CPT | Performed by: PHYSICAL THERAPIST

## 2023-08-21 NOTE — PROGRESS NOTES
Diagnosis: Hypermobility syndrome (M35.7)           Next MD visit: none scheduled  Fall Risk: standard         Precautions: n/a          Medication Changes since last visit?: No      Subjective: Patient states her knee is feeling better. Pt describes pain level 0/10 at this time. Objective:  Trunk ROM is WFL into all planes  B knee ROM is WFL into all planes      Assessment: Patient is doing well  with progression of therapeutic exercises. No knee or back pain during therapeutic exercises. Patient and PT goals are in progress. Plan: Continue PT. Progress therapeutic exercises.      8/21/2023  Visit#: 9/12 until 9/30 [facility] 8/18/2023  Visit#: 8/12 until 9/30 [facility] 8/16/2023  Visit#: 7/12 intil 9/30 [facility] 8/9/2023  Visit#: 6/12 until 9/30 [facility]   Thera Ex   X23min  - Shuttle B LE extensions 6 bands 10 x 3  - Shuttle single LE extension 4-5 bands 10 x 3  - green theraband resisted LE abduction 10 x 3  - multi-pull rows 10 x 2  - multi-pull high to low rotation 10 x 2  - neuromuscular re-ed   X23min  - Shuttle B LE extensions 6 bands 10 x 3  - Shuttle single LE extension 4-5 bands 10 x 3  - green theraband resisted LE abduction 10 x 3  - neuromuscular re-ed X23min  - Shuttle B LE extensions 6 bands 10 x 3  - Shuttle single LE extension 4-5 bands 10 x 3  - green theraband resisted LE abduction 10 x 2  - neuromuscular re-ed   X25min  - prone press ups  - supine hamstring stretch 10 x 5 sec hold  - neuromuscular re-ed  - Shuttle B LE extensions 5 bands 10x  3  - Shuttle single LE extensions 3 bands 10x  2  - Neuromuscular re-ed  - elliptical x 10min     Neuromuscular Re-education   X23min  - RDL's on airex with 5lb db's for LE stability  - seated on swiss ball forward press, overhead press with 4lb db's 10x  - standing on airex beam cable shoulder rows, extensions 10-20# 10 x 3  - alternate forward lunges on BOSU with 5lb db's 10 x 2  - side lunges on BOSU with 5lb db's 10 x 2  - RDL high marching with 10lb farmer's carry  - side stepping with 10lb farmer's carry X23min  - bird dog with 3lb db's 10 x 3  - quadruped trunk rotation arm lifts with 3lb db 10 x 2  - seated on swiss ball forward press, overhead press with 4lb db's 10x  - standing on airex beam cable shoulder rows, extensions 10-20# 10 x 2  - alternate forward lunges on BOSU with 5lb db's 10 x 2  - side lunges on BOSU with 5lb db's 10 x 2  - high step marching with 10lb farmer's carry X25min  - bird dog with 5lb db's 10 x 2  - quadruped trunk rotation arm lifts with 3lb db 10 x 2  - standing on airex beam forward press, overhead press with 4lb db's 10x  - standing on airex beam cable shoulder rows, extensions 10-20# 10 x 2  - alternate forward lunges on BOSU with 5lb db's 10 x 2  - side lunges on BOSU with 5lb db's 10 x 2  - high step marching with 10lb farmer's carry   X25min  - trunk stabilization and core strengthening to improve trunk control  - supine deabdugs with 3lb db's 10x  2  - swiss ball bridging 20 x 5 sec hold  - quadruped trunk rotation arm lifts without wt 20x  - bird dog 10 x 2  - seated swiss ball forward press, overhead press with 4lb db's 10x  - seated swiss ball cable shoulder rows 20# 10 x 2  - alternate forward lunges on BOSU with 5lb db's 10 x 2  - side lunges on BOSU with 5lb db's 10 x 2  - single LE stance on BOSU 20 sec x 5                                    Charges: EX2, Neuro Re-ed2   Total Timed Treatment: 46 min  Total Treatment Time: 46 min

## 2023-08-23 ENCOUNTER — TELEMEDICINE (OUTPATIENT)
Dept: FAMILY MEDICINE CLINIC | Facility: CLINIC | Age: 23
End: 2023-08-23

## 2023-08-23 ENCOUNTER — TELEPHONE (OUTPATIENT)
Dept: FAMILY MEDICINE CLINIC | Facility: CLINIC | Age: 23
End: 2023-08-23

## 2023-08-23 DIAGNOSIS — R10.9 ABDOMINAL CRAMPING: ICD-10-CM

## 2023-08-23 DIAGNOSIS — F41.9 ANXIETY: ICD-10-CM

## 2023-08-23 DIAGNOSIS — K21.9 GASTROESOPHAGEAL REFLUX DISEASE WITHOUT ESOPHAGITIS: ICD-10-CM

## 2023-08-23 DIAGNOSIS — M24.9 HYPERMOBILITY OF JOINT: Primary | ICD-10-CM

## 2023-08-23 DIAGNOSIS — R42 DIZZINESS: ICD-10-CM

## 2023-08-23 PROCEDURE — 99213 OFFICE O/P EST LOW 20 MIN: CPT | Performed by: FAMILY MEDICINE

## 2023-08-23 RX ORDER — ONDANSETRON 8 MG/1
8 TABLET, ORALLY DISINTEGRATING ORAL EVERY 8 HOURS PRN
Qty: 30 TABLET | Refills: 0 | Status: SHIPPED | OUTPATIENT
Start: 2023-08-23

## 2023-08-23 RX ORDER — DICYCLOMINE HCL 20 MG
20 TABLET ORAL
Qty: 120 TABLET | Refills: 0 | Status: SHIPPED | OUTPATIENT
Start: 2023-08-23

## 2023-08-23 NOTE — TELEPHONE ENCOUNTER
Patient has been calling for appt with  and leaving message but never a call back to schedule. We need someone else in network. Please give recommendations.

## 2023-08-23 NOTE — PROGRESS NOTES
This visit is conducted using Telemedicine with live, interactive video and audio. Patient has been referred to the [facility] website at [email or web address] to review the yearly Consent to Treat document. Patient understands and accepts financial responsibility for any deductible, co-insurance and/or co-pays associated with this service. Celeste June is a 26year old female. HPI:   Has no appointment with  - they are not responding - never answers. Going to physical therapy and has been helpful - improving her workouts and less pain in her joints. Reports brain fog with lexapro - so stopped. Anxiety is controlled without it. Still having reflux - protonix helps but still has to take zofran daily. Still having bloating symptoms. Concerned she has POTS syndrome - ongoing dizziness. Going to [employer] for college. ondansetron 4 MG Oral Tablet Dispersible, Take 1 tablet (4 mg total) by mouth every 8 (eight) hours as needed for Nausea., Disp: 30 tablet, Rfl: 1  Norethin Ace-Eth Estrad-FE (MICROGESTIN FE 1.5/30) 1.5-30 MG-MCG Oral Tab, Take 1 tablet by mouth daily. , Disp: 84 tablet, Rfl: 5  pantoprazole 40 MG Oral Tab EC, Take 1 tablet (40 mg total) by mouth 2 (two) times daily before meals. , Disp: 180 tablet, Rfl: 4  escitalopram (LEXAPRO) 10 MG Oral Tab, Take 1 tablet (10 mg total) by mouth daily. Start 1/2 tablet for 1 week, Disp: 30 tablet, Rfl: 1    No current facility-administered medications on file prior to visit. No past medical history on file.    Social History:  Social History     Socioeconomic History    Marital status: Single   Tobacco Use    Smoking status: Never    Smokeless tobacco: Never   Vaping Use    Vaping Use: Never used   Substance and Sexual Activity    Alcohol use: No    Drug use: No   Other Topics Concern    Pt has a pacemaker No    Pt has a defibrillator No    Reaction to local anesthetic No        REVIEW OF SYSTEMS:   GENERAL HEALTH: feels well otherwise  SKIN: denies any unusual skin lesions or rashes  HEENT: denies eye complaints,denies sore throat, denies ear pain  RESPIRATORY: denies shortness of breath, denies cough  CARDIOVASCULAR: denies chest pain  GI: pos abdominal pain and pos heartburn  NEURO: pos headaches  Musculoskeletal: pos joint pain    EXAM:   There were no vitals taken for this visit. GENERAL: well developed, well nourished,in no apparent distress      ASSESSMENT AND PLAN:   1. Hypermobility of joint  Still trying to get in with  for diagnosis of Chris Miramontested - will give call from our end also. Continue PT as helping.   - CARDIO - INTERNAL    2. Anxiety  Stable off medications. 3. Dizziness  Patient concerned about POTS - it can be linked with Chris Elizalde so certain possibility. Referral to cards for eval - tilt test etc.   - CARDIO - INTERNAL    4. Abdominal cramping  Trial of bentyl before every meal and zofran prn.     5. Gastroesophageal reflux disease without esophagitis  Continue protonix. The patient indicates understanding of these issues and agrees to the plan.       Rosaura Lockett MD  8/23/2023  1:30 PM

## 2023-08-24 ENCOUNTER — APPOINTMENT (OUTPATIENT)
Dept: PHYSICAL THERAPY | Age: 23
End: 2023-08-24
Attending: FAMILY MEDICINE
Payer: COMMERCIAL

## 2023-08-24 ENCOUNTER — OFFICE VISIT (OUTPATIENT)
Dept: PHYSICAL THERAPY | Age: 23
End: 2023-08-24
Attending: FAMILY MEDICINE
Payer: COMMERCIAL

## 2023-08-24 PROCEDURE — 97112 NEUROMUSCULAR REEDUCATION: CPT | Performed by: PHYSICAL THERAPIST

## 2023-08-24 PROCEDURE — 97110 THERAPEUTIC EXERCISES: CPT | Performed by: PHYSICAL THERAPIST

## 2023-08-24 NOTE — PROGRESS NOTES
Diagnosis: Hypermobility syndrome (M35.7)           Next MD visit: none scheduled  Fall Risk: standard         Precautions: n/a          Medication Changes since last visit?: No      Subjective: Patient states her knee is feeling better. Still with occasional knee pain when working out. Pt describes pain level 0/10 at this time. Objective:  Trunk ROM is WFL into all planes  B knee ROM is WFL into all planes      Assessment: Patient is doing well with progression of therapeutic exercises. Still reports occasional knee pain. Patient and PT goals are in progress. Plan: Continue PT. Progress therapeutic exercises.      8/24/2023  Visit#: 10/12 until 9/30 [facility] 8/21/2023  Visit#: 9/12 until 9/30 [facility] 8/18/2023  Visit#: 8/12 until 9/30 [facility] 8/16/2023  Visit#: 7/12 intil 9/30 [facility]   Thera Ex   X23min  - Shuttle B LE extensions 6 bands 10 x 3  - Shuttle single LE extension 4-5 bands 10 x 3  - green theraband resisted LE abduction, extension 10 x 2  - multi-pull rows 10 x 2  - multi-pull high to low rotation 10 x 2  - neuromuscular re-ed X23min  - Shuttle B LE extensions 6 bands 10 x 3  - Shuttle single LE extension 4-5 bands 10 x 3  - green theraband resisted LE abduction 10 x 3  - multi-pull rows 10 x 2  - multi-pull high to low rotation 10 x 2  - neuromuscular re-ed   X23min  - Shuttle B LE extensions 6 bands 10 x 3  - Shuttle single LE extension 4-5 bands 10 x 3  - green theraband resisted LE abduction 10 x 3  - neuromuscular re-ed X23min  - Shuttle B LE extensions 6 bands 10 x 3  - Shuttle single LE extension 4-5 bands 10 x 3  - green theraband resisted LE abduction 10 x 2  - neuromuscular re-ed     Neuromuscular Re-education   X23min  - RDL's on airex with 5lb db's for LE stability  - seated on swiss ball forward press, overhead press with 4lb db's 10x  - standing on airex beam cable shoulder rows, extensions 10-20# 10 x 3  - alternate forward lunges on BOSU with 5lb db's 10 x 2  - side lunges on BOSU with 5lb db's 10 x 2  - high marching with 10lb farmer's carry X23min  - RDL's on airex with 5lb db's for LE stability  - seated on swiss ball forward press, overhead press with 4lb db's 10x  - standing on airex beam cable shoulder rows, extensions 10-20# 10 x 3  - alternate forward lunges on BOSU with 5lb db's 10 x 2  - side lunges on BOSU with 5lb db's 10 x 2  - high marching with 10lb farmer's carry  - side stepping with 10lb farmer's carry X23min  - bird dog with 3lb db's 10 x 3  - quadruped trunk rotation arm lifts with 3lb db 10 x 2  - seated on swiss ball forward press, overhead press with 4lb db's 10x  - standing on airex beam cable shoulder rows, extensions 10-20# 10 x 2  - alternate forward lunges on BOSU with 5lb db's 10 x 2  - side lunges on BOSU with 5lb db's 10 x 2  - high step marching with 10lb farmer's carry X25min  - bird dog with 5lb db's 10 x 2  - quadruped trunk rotation arm lifts with 3lb db 10 x 2  - standing on airex beam forward press, overhead press with 4lb db's 10x  - standing on airex beam cable shoulder rows, extensions 10-20# 10 x 2  - alternate forward lunges on BOSU with 5lb db's 10 x 2  - side lunges on BOSU with 5lb db's 10 x 2  - high step marching with 10lb farmer's carry                                    Charges: EX2, Neuro Re-ed2   Total Timed Treatment: 46 min  Total Treatment Time: 46 min

## 2023-08-28 ENCOUNTER — OFFICE VISIT (OUTPATIENT)
Dept: PHYSICAL THERAPY | Age: 23
End: 2023-08-28
Attending: FAMILY MEDICINE
Payer: COMMERCIAL

## 2023-08-28 ENCOUNTER — APPOINTMENT (OUTPATIENT)
Dept: PHYSICAL THERAPY | Age: 23
End: 2023-08-28
Attending: FAMILY MEDICINE
Payer: COMMERCIAL

## 2023-08-28 PROCEDURE — 97112 NEUROMUSCULAR REEDUCATION: CPT | Performed by: PHYSICAL THERAPIST

## 2023-08-28 PROCEDURE — 97110 THERAPEUTIC EXERCISES: CPT | Performed by: PHYSICAL THERAPIST

## 2023-08-28 NOTE — PROGRESS NOTES
Discharge Summary  Pt has attended 11 visits in Physical Therapy. Diagnosis: Hypermobility syndrome (M35.7)           Next MD visit: none scheduled  Fall Risk: standard         Precautions: n/a          Medication Changes since last visit?: No      Subjective: Patient states her knee is feeling better. Reports no pain at this time. Pt describes pain level 0/10. Objective:  Trunk ROM is WFL into all planes  B knee ROM is WFL into all planes      Assessment: Referred to PT for eval and tx due to hypermobility syndrome. Patient has completed 11 PT visits. Patient reports occasional residual pain. ROM is WFL throughout. Strength is grossly 5/5 throughout. Patient is independent with her home exercises, its progression, and management of residual symptoms. Patient and PT goals have been met.      8/28/2023  Visit#: 11 [facility] 8/24/2023  Visit#: 10/12 until 9/30 [facility] 8/21/2023  Visit#: 9/12 until 9/30 [facility]   Thera Ex   X23min  - Shuttle B LE extensions 6 bands 10 x 3  - Shuttle single LE extension 4-5 bands 10 x 3  - green theraband resisted LE abduction, extension 10 x 2  - multi-pull rows 15# 10 x 2  - multi-pull high to low rotation 10# 10 x 2  - neuromuscular re-ed  - elliptical x 10min   X23min  - Shuttle B LE extensions 6 bands 10 x 3  - Shuttle single LE extension 4-5 bands 10 x 3  - green theraband resisted LE abduction, extension 10 x 2  - multi-pull rows 10 x 2  - multi-pull high to low rotation 10 x 2  - neuromuscular re-ed X23min  - Shuttle B LE extensions 6 bands 10 x 3  - Shuttle single LE extension 4-5 bands 10 x 3  - green theraband resisted LE abduction 10 x 3  - multi-pull rows 10 x 2  - multi-pull high to low rotation 10 x 2  - neuromuscular re-ed     Neuromuscular Re-education   X23min  - RDL's on airex with 5lb db's for LE stability  - seated on swiss ball forward press, overhead press with 4lb db's 10x  - standing on airex beam cable shoulder rows, extensions 10-20# 10 x 3  - alternate forward lunges on BOSU with 5lb db's 10 x 2  - side lunges on BOSU with 5lb db's 10 x 2  - high marching with 10lb farmer's carry X23min  - RDL's on airex with 5lb db's for LE stability  - seated on swiss ball forward press, overhead press with 4lb db's 10x  - standing on airex beam cable shoulder rows, extensions 10-20# 10 x 3  - alternate forward lunges on BOSU with 5lb db's 10 x 2  - side lunges on BOSU with 5lb db's 10 x 2  - high marching with 10lb farmer's carry X23min  - RDL's on airex with 5lb db's for LE stability  - seated on swiss ball forward press, overhead press with 4lb db's 10x  - standing on airex beam cable shoulder rows, extensions 10-20# 10 x 3  - alternate forward lunges on BOSU with 5lb db's 10 x 2  - side lunges on BOSU with 5lb db's 10 x 2  - high marching with 10lb farmer's carry  - side stepping with 10lb farmer's carry                               Charges: EX2, Neuro Re-ed2   Total Timed Treatment: 46 min  Total Treatment Time: 46 min    Plan: Discharged from PT. Patient will continue with her home exercises. Patient/Family/Caregiver was advised of these findings, precautions, and treatment options and has agreed to actively participate in planning and for this course of care. Thank you for your referral. If you have any questions, please contact me at Dept: [phone].     Sincerely,  Electronically signed by therapist: Norlene Gaucher., PT

## 2023-08-31 ENCOUNTER — APPOINTMENT (OUTPATIENT)
Dept: PHYSICAL THERAPY | Age: 23
End: 2023-08-31
Attending: FAMILY MEDICINE
Payer: COMMERCIAL

## 2023-12-09 NOTE — TELEPHONE ENCOUNTER
Dr. Cathy Fried - Although Refill passed per [place] Agent Ace [place], [facility] protocol, short term Rx given for PRN medication at 8/23/23 Visit. Continue to dispense, a longer term supply? Please advise.      Requested Prescriptions   Pending Prescriptions Disp Refills    DICYCLOMINE 20 MG Oral Tab [Pharmacy Med Name: DICYCLOMINE 20MG TABLETS] 120 tablet 0     Sig: TAKE 1 TABLET(20 MG) BY MOUTH FOUR TIMES DAILY BEFORE MEALS AND AT NIGHT       Gastrointestional Medication Protocol Passed - 12/8/2023 11:28 AM        Passed - In person appointment or virtual visit in the past 12 mos or appointment in next 3 mos     Recent Outpatient Visits              3 months ago     [facility] in [place] [id number]., Ace Montaño, PT    Office Visit    3 months ago     [facility] in [place] [address] Ace Montaño, PT    Office Visit    3 months ago Hypermobility of joint    [address], [address], Amber Zheng MD    Telemedicine    3 months ago     [facility] in [place] [address] Ace Montaño, PT    Office Visit    3 months ago     [facility] in [place] [address]., Ace Montaño, [place]    Office Visit                         Recent Outpatient Visits              3 months ago     [facility] in [place] [address] Ace Montaño, PT    Office Visit    3 months ago     [facility] in [place] [address] Ace Montaño, PT    Office Visit    3 months ago Hypermobility of joint    [address], [address], Amber Zheng MD    Telemedicine    3 months ago     [facility] in [place] [address] Ace Montaño, PT    Office Visit    3 months ago     [facility] in [place] [address] Ace Montaño, PT    Office Visit

## 2023-12-10 RX ORDER — DICYCLOMINE HCL 20 MG
20 TABLET ORAL 4 TIMES DAILY
Qty: 120 TABLET | Refills: 2 | Status: SHIPPED | OUTPATIENT
Start: 2023-12-10

## 2024-04-04 RX ORDER — PANTOPRAZOLE SODIUM 40 MG/1
40 TABLET, DELAYED RELEASE ORAL
Qty: 180 TABLET | Refills: 3 | Status: SHIPPED | OUTPATIENT
Start: 2024-04-04

## 2024-04-04 NOTE — TELEPHONE ENCOUNTER
REFILL PASSED PER [facility] PROTOCOLS    Requested Prescriptions   Pending Prescriptions Disp Refills    PANTOPRAZOLE 40 MG Oral Tab EC [Pharmacy Med Name: PANTOPRAZOLE 40MG TABLETS] 180 tablet 4     Sig: TAKE 1 TABLET(40 MG) BY MOUTH TWICE DAILY BEFORE MEALS       Gastrointestional Medication Protocol Passed - 4/2/2024  8:37 AM        Passed - In person appointment or virtual visit in the past 12 mos or appointment in next 3 mos     Recent Outpatient Visits              7 months ago     [place]  [facility], Lei POLLARD, PT    Office Visit    7 months ago     [place]  [facility], Lei POLLARD, PT    Office Visit    7 months ago Hypermobility of joint    [facility] Laisha Cox MD    Telemedicine    7 months ago     [place]  [facility], Lei POLLARD, PT    Office Visit    7 months ago     [place]  [facility], Lei POLLARD, PT    Office Visit          Future Appointments         Provider Department Appt Notes    In 1 week Laisha Cox MD [facility] [place], [place] medication, existing conditions    In 1 month Laisha Cox MD [facility] - follow up on existing conditions, also following up after meeting with hyper                     Future Appointments         Provider Department Appt Notes    In 1 week Laisha Cox MD [facility] medication, existing conditions    In 1 month Laisha Cox MD [facility] - follow up on existing conditions, also following up after meeting with hyper           Recent Outpatient Visits              7 months ago     [place]  [facility], Lei POLLARD, PT    Office Visit    7 months ago     [place]  [facility] in  Lei Brown Jr., PT    Office Visit    7 months ago Hypermobility of joint    [facility] [address], Laisha Johns MD    Telemedicine    7 months ago     [place]  [facility] in Le[place] Brown Jr., PT    Office Visit    7 months ago     [place]  [facility] Lei POLLARD, PT    Office Visit

## 2024-05-21 ENCOUNTER — OFFICE VISIT (OUTPATIENT)
Dept: FAMILY MEDICINE CLINIC | Facility: CLINIC | Age: 24
End: 2024-05-21

## 2024-05-21 VITALS
HEART RATE: 65 BPM | SYSTOLIC BLOOD PRESSURE: 130 MMHG | HEIGHT: 71 IN | WEIGHT: 174.19 LBS | DIASTOLIC BLOOD PRESSURE: 82 MMHG | BODY MASS INDEX: 24.39 KG/M2

## 2024-05-21 DIAGNOSIS — R10.9 ABDOMINAL CRAMPING: ICD-10-CM

## 2024-05-21 DIAGNOSIS — M24.9 HYPERMOBILITY OF JOINT: Primary | ICD-10-CM

## 2024-05-21 DIAGNOSIS — K21.9 GASTROESOPHAGEAL REFLUX DISEASE WITHOUT ESOPHAGITIS: ICD-10-CM

## 2024-05-21 DIAGNOSIS — G90.A POTS (POSTURAL ORTHOSTATIC TACHYCARDIA SYNDROME): ICD-10-CM

## 2024-05-21 DIAGNOSIS — Z00.00 ROUTINE MEDICAL EXAM: ICD-10-CM

## 2024-05-21 DIAGNOSIS — R23.8 PIEZOGENIC PEDAL PAPULE: ICD-10-CM

## 2024-05-21 DIAGNOSIS — F41.9 ANXIETY: ICD-10-CM

## 2024-05-21 PROCEDURE — 3079F DIAST BP 80-89 MM HG: CPT | Performed by: FAMILY MEDICINE

## 2024-05-21 PROCEDURE — 99214 OFFICE O/P EST MOD 30 MIN: CPT | Performed by: FAMILY MEDICINE

## 2024-05-21 PROCEDURE — 99395 PREV VISIT EST AGE 18-39: CPT | Performed by: FAMILY MEDICINE

## 2024-05-21 PROCEDURE — 3008F BODY MASS INDEX DOCD: CPT | Performed by: FAMILY MEDICINE

## 2024-05-21 PROCEDURE — 3075F SYST BP GE 130 - 139MM HG: CPT | Performed by: FAMILY MEDICINE

## 2024-05-21 RX ORDER — CYCLOBENZAPRINE HCL 5 MG
5 TABLET ORAL 3 TIMES DAILY PRN
Qty: 30 TABLET | Refills: 0 | Status: SHIPPED | OUTPATIENT
Start: 2024-05-21

## 2024-05-21 RX ORDER — NORETHINDRONE ACETATE AND ETHINYL ESTRADIOL 1.5-30(21)
1 KIT ORAL DAILY
Qty: 84 TABLET | Refills: 5 | Status: SHIPPED | OUTPATIENT
Start: 2024-05-21

## 2024-05-21 RX ORDER — ONDANSETRON 8 MG/1
8 TABLET, ORALLY DISINTEGRATING ORAL EVERY 8 HOURS PRN
Qty: 30 TABLET | Refills: 0 | Status: SHIPPED | OUTPATIENT
Start: 2024-05-21

## 2024-05-21 RX ORDER — DICYCLOMINE HCL 20 MG
20 TABLET ORAL 4 TIMES DAILY
Qty: 120 TABLET | Refills: 2 | Status: SHIPPED | OUTPATIENT
Start: 2024-05-21

## 2024-05-21 NOTE — PROGRESS NOTES
HPI:   Kizzy Covarrubias is a 23 year old female who presents for a complete physical exam.    Reports had on-line appointment hypermobility specialist and recommended that PCP does work up further.   Reflux is a bit better - taking protonix BID daily.   Wt Readings from Last 3 Encounters:   05/21/24 174 lb 3.2 oz (79 kg)   04/05/23 186 lb 6.4 oz (84.6 kg)   09/08/22 176 lb 6.4 oz (80 kg)     Body mass index is 24.3 kg/m².       Current Outpatient Medications   Medication Sig Dispense Refill    pantoprazole 40 MG Oral Tab EC Take 1 tablet (40 mg total) by mouth 2 (two) times daily before meals. 180 tablet 3    dicyclomine 20 MG Oral Tab Take 1 tablet (20 mg total) by mouth 4 (four) times daily. BEFORE MEALS AND NIGHTLY 120 tablet 2    ondansetron 8 MG Oral Tablet Dispersible Take 1 tablet (8 mg total) by mouth every 8 (eight) hours as needed for Nausea. 30 tablet 0    Norethin Ace-Eth Estrad-FE (MICROGESTIN FE 1.5/30) 1.5-30 MG-MCG Oral Tab Take 1 tablet by mouth daily. 84 tablet 5      Past Medical History:    Esophageal reflux      No past surgical history on file.   No family history on file.   Social History:   Social History     Socioeconomic History    Marital status: Single   Tobacco Use    Smoking status: Never    Smokeless tobacco: Never   Vaping Use    Vaping status: Never Used   Substance and Sexual Activity    Alcohol use: No    Drug use: No   Other Topics Concern    Pt has a pacemaker No    Pt has a defibrillator No    Reaction to local anesthetic No          REVIEW OF SYSTEMS:   GENERAL: feels well otherwise  SKIN: denies any skin lesions  EYES:denies blurred vision or double vision  HEENT: denies nasal congestion, sinus pain or ST  LUNGS: denies shortness of breath with exertion, denies orthopnea  CARDIOVASCULAR: denies chest pain on exertion  GI: pos heartburn and abdominal cramping.   : denies dysuria, vaginal discharge or itching,irregular menses  MUSCULOSKELETAL: chronic pain in legs   NEURO:  denies headaches      EXAM:   /82   Pulse 65   Ht 5' 11\" (1.803 m)   Wt 174 lb 3.2 oz (79 kg)   LMP 05/07/2024   BMI 24.30 kg/m²     GENERAL: well developed, well nourished,in no apparent distress  SKIN: no rashes,no suspicious lesions  HEENT: atraumatic, normocephalic,ears and throat are clear  EYES:PERRLA, EOMI, conjunctiva are clear  LUNGS: clear to auscultation  CARDIO: RRR without murmur  GI: good BS's,no masses, HSM or tenderness  EXTREMITIES: no cyanosis, clubbing or edema  NEURO: Oriented times three,cranial nerves are intact,motor and sensory are grossly intact  Positive piezogenic papules of the heel  Positive Sierra and walker signs   Positive striae   ASSESSMENT AND PLAN:   Kizzy Covarrubias is a 23 year old female who presents for a complete physical exam.    1. Hypermobility of joint  Has several criteria met. Will do further work up with echo. Adding flexeril for treatment.   - CARD ECHO 2D DOPPLER (CPT=93306); Future    2. Anxiety      3. Gastroesophageal reflux disease without esophagitis      4. Routine medical exam    - CBC With Differential With Platelet  - Comp Metabolic Panel (14)  - Lipid Panel  - TSH W Reflex To Free T4  - Vitamin D; Future  - Vitamin B12 [E]    5. Abdominal cramping      6. Piezogenic pedal papule         Laisha Cox MD  5/21/2024  11:05 AM

## 2024-06-05 ENCOUNTER — RX ONLY (RX ONLY)
Age: 24
End: 2024-06-05

## 2024-06-05 RX ORDER — TRETIONIN 0.25 MG/G
CREAM TOPICAL
Qty: 45 | Refills: 1 | Status: ERX

## 2024-06-15 ENCOUNTER — LAB ENCOUNTER (OUTPATIENT)
Dept: LAB | Age: 24
End: 2024-06-15
Attending: FAMILY MEDICINE

## 2024-06-15 DIAGNOSIS — Z00.00 ROUTINE MEDICAL EXAM: ICD-10-CM

## 2024-06-15 LAB
ALBUMIN SERPL-MCNC: 4.2 G/DL (ref 3.2–4.8)
ALBUMIN/GLOB SERPL: 1.6 {RATIO} (ref 1–2)
ALP LIVER SERPL-CCNC: 83 U/L
ALT SERPL-CCNC: 21 U/L
ANION GAP SERPL CALC-SCNC: 9 MMOL/L (ref 0–18)
AST SERPL-CCNC: 17 U/L (ref ?–34)
BASOPHILS # BLD AUTO: 0.08 X10(3) UL (ref 0–0.2)
BASOPHILS NFR BLD AUTO: 1.4 %
BILIRUB SERPL-MCNC: 0.5 MG/DL (ref 0.3–1.2)
BUN BLD-MCNC: 7 MG/DL (ref 9–23)
BUN/CREAT SERPL: 9.5 (ref 10–20)
CALCIUM BLD-MCNC: 9.2 MG/DL (ref 8.7–10.4)
CHLORIDE SERPL-SCNC: 109 MMOL/L (ref 98–112)
CHOLEST SERPL-MCNC: 135 MG/DL (ref ?–200)
CO2 SERPL-SCNC: 24 MMOL/L (ref 21–32)
CREAT BLD-MCNC: 0.74 MG/DL
DEPRECATED RDW RBC AUTO: 41.6 FL (ref 35.1–46.3)
EGFRCR SERPLBLD CKD-EPI 2021: 118 ML/MIN/1.73M2 (ref 60–?)
EOSINOPHIL # BLD AUTO: 0.1 X10(3) UL (ref 0–0.7)
EOSINOPHIL NFR BLD AUTO: 1.8 %
ERYTHROCYTE [DISTWIDTH] IN BLOOD BY AUTOMATED COUNT: 12.8 % (ref 11–15)
FASTING PATIENT LIPID ANSWER: YES
FASTING STATUS PATIENT QL REPORTED: YES
GLOBULIN PLAS-MCNC: 2.6 G/DL (ref 2–3.5)
GLUCOSE BLD-MCNC: 80 MG/DL (ref 70–99)
HCT VFR BLD AUTO: 37.5 %
HDLC SERPL-MCNC: 42 MG/DL (ref 40–59)
HGB BLD-MCNC: 12.3 G/DL
IMM GRANULOCYTES # BLD AUTO: 0.01 X10(3) UL (ref 0–1)
IMM GRANULOCYTES NFR BLD: 0.2 %
LDLC SERPL CALC-MCNC: 70 MG/DL (ref ?–100)
LYMPHOCYTES # BLD AUTO: 2.29 X10(3) UL (ref 1–4)
LYMPHOCYTES NFR BLD AUTO: 40.6 %
MCH RBC QN AUTO: 29.6 PG (ref 26–34)
MCHC RBC AUTO-ENTMCNC: 32.8 G/DL (ref 31–37)
MCV RBC AUTO: 90.1 FL
MONOCYTES # BLD AUTO: 0.47 X10(3) UL (ref 0.1–1)
MONOCYTES NFR BLD AUTO: 8.3 %
NEUTROPHILS # BLD AUTO: 2.69 X10 (3) UL (ref 1.5–7.7)
NEUTROPHILS # BLD AUTO: 2.69 X10(3) UL (ref 1.5–7.7)
NEUTROPHILS NFR BLD AUTO: 47.7 %
NONHDLC SERPL-MCNC: 93 MG/DL (ref ?–130)
OSMOLALITY SERPL CALC.SUM OF ELEC: 291 MOSM/KG (ref 275–295)
PLATELET # BLD AUTO: 336 10(3)UL (ref 150–450)
POTASSIUM SERPL-SCNC: 4.1 MMOL/L (ref 3.5–5.1)
PROT SERPL-MCNC: 6.8 G/DL (ref 5.7–8.2)
RBC # BLD AUTO: 4.16 X10(6)UL
SODIUM SERPL-SCNC: 142 MMOL/L (ref 136–145)
TRIGL SERPL-MCNC: 127 MG/DL (ref 30–149)
TSI SER-ACNC: 2.84 MIU/ML (ref 0.55–4.78)
VIT B12 SERPL-MCNC: 430 PG/ML (ref 211–911)
VIT D+METAB SERPL-MCNC: 40.4 NG/ML (ref 30–100)
VLDLC SERPL CALC-MCNC: 19 MG/DL (ref 0–30)
WBC # BLD AUTO: 5.6 X10(3) UL (ref 4–11)

## 2024-06-15 PROCEDURE — 80061 LIPID PANEL: CPT | Performed by: FAMILY MEDICINE

## 2024-06-15 PROCEDURE — 82607 VITAMIN B-12: CPT | Performed by: FAMILY MEDICINE

## 2024-06-15 PROCEDURE — 80053 COMPREHEN METABOLIC PANEL: CPT | Performed by: FAMILY MEDICINE

## 2024-06-15 PROCEDURE — 84443 ASSAY THYROID STIM HORMONE: CPT | Performed by: FAMILY MEDICINE

## 2024-06-15 PROCEDURE — 85025 COMPLETE CBC W/AUTO DIFF WBC: CPT | Performed by: FAMILY MEDICINE

## 2024-06-15 PROCEDURE — 36415 COLL VENOUS BLD VENIPUNCTURE: CPT | Performed by: FAMILY MEDICINE

## 2024-06-15 PROCEDURE — 82306 VITAMIN D 25 HYDROXY: CPT

## 2024-06-25 ENCOUNTER — HOSPITAL ENCOUNTER (OUTPATIENT)
Dept: CV DIAGNOSTICS | Facility: HOSPITAL | Age: 24
Discharge: HOME | End: 2024-06-25
Attending: FAMILY MEDICINE

## 2024-06-25 DIAGNOSIS — M24.9 HYPERMOBILITY OF JOINT: ICD-10-CM

## 2024-06-25 PROCEDURE — 93306 TTE W/DOPPLER COMPLETE: CPT | Performed by: FAMILY MEDICINE

## 2024-07-24 ENCOUNTER — APPOINTMENT (OUTPATIENT)
Dept: URBAN - METROPOLITAN AREA CLINIC 244 | Age: 24
Setting detail: DERMATOLOGY
End: 2024-07-25

## 2024-07-24 DIAGNOSIS — L70.0 ACNE VULGARIS: ICD-10-CM

## 2024-07-24 PROCEDURE — OTHER COUNSELING: OTHER

## 2024-07-24 PROCEDURE — OTHER PRESCRIPTION: OTHER

## 2024-07-24 PROCEDURE — 99214 OFFICE O/P EST MOD 30 MIN: CPT

## 2024-07-24 PROCEDURE — OTHER PRESCRIPTION MEDICATION MANAGEMENT: OTHER

## 2024-07-24 RX ORDER — TRETIONIN 0.25 MG/G
CREAM TOPICAL
Qty: 45 | Refills: 1 | Status: ERX | COMMUNITY
Start: 2024-07-24

## 2024-07-24 RX ORDER — CLINDAMYCIN PHOSPHATE AND BENZOYL PEROXIDE 1 %-5 %
KIT TOPICAL
Qty: 50 | Refills: 4 | Status: ERX | COMMUNITY
Start: 2024-07-24

## 2024-07-24 RX ORDER — DOXYCYCLINE 100 MG/1
CAPSULE ORAL BID
Qty: 60 | Refills: 3 | Status: ERX | COMMUNITY
Start: 2024-07-24

## 2024-07-24 ASSESSMENT — LOCATION DETAILED DESCRIPTION DERM: LOCATION DETAILED: LEFT INFERIOR CENTRAL MALAR CHEEK

## 2024-07-24 ASSESSMENT — LOCATION SIMPLE DESCRIPTION DERM: LOCATION SIMPLE: LEFT CHEEK

## 2024-07-24 ASSESSMENT — LOCATION ZONE DERM: LOCATION ZONE: FACE

## 2024-07-24 NOTE — PROCEDURE: PRESCRIPTION MEDICATION MANAGEMENT
Detail Level: Simple
Continue Regimen: Tretinoin, Clinda
Render In Strict Bullet Format?: No
Plan: Doxy prn for flares two weeks per flare
Modify Regimen: Avelino to Doxy

## 2024-07-31 ENCOUNTER — MED REC SCAN ONLY (OUTPATIENT)
Dept: FAMILY MEDICINE CLINIC | Facility: CLINIC | Age: 24
End: 2024-07-31

## 2024-08-16 RX ORDER — ONDANSETRON 8 MG/1
8 TABLET, ORALLY DISINTEGRATING ORAL EVERY 8 HOURS PRN
Qty: 30 TABLET | Refills: 0 | Status: SHIPPED | OUTPATIENT
Start: 2024-08-16

## 2024-08-16 NOTE — TELEPHONE ENCOUNTER
Please review. Protocol Failed; No Protocol    Requested Prescriptions   Pending Prescriptions Disp Refills    ondansetron 8 MG Oral Tablet Dispersible 30 tablet 0     Sig: Take 1 tablet (8 mg total) by mouth every 8 (eight) hours as needed for Nausea.       There is no refill protocol information for this order              Recent Outpatient Visits              2 months ago [facility] of [facility], Laisha Johns MD    Office Visit    11 months ago     [place]  [facility]    Office Visit    11 months ago     [place]  [facility]    Office Visit    11 months ago Hypermob[facility] of [facility], Laisha Johns MD    Telemedicine    12 months ago     [place]  [facility], Lei POLLARD, PT    Office Visit

## 2024-08-27 ENCOUNTER — RX ONLY (RX ONLY)
Age: 24
End: 2024-08-27

## 2024-08-27 RX ORDER — CLINDAMYCIN PHOSPHATE 10 MG/ML
LOTION TOPICAL
Qty: 60 | Refills: 4 | Status: ERX | COMMUNITY
Start: 2024-08-27

## 2024-09-14 NOTE — TELEPHONE ENCOUNTER
Protocol Failed/ No Protocol    Requested Prescriptions   Pending Prescriptions Disp Refills    ONDANSETRON 8 MG Oral Tablet Dispersible [Pharmacy Med Name: ONDANSETRON ODT 8MG TABLETS] 30 tablet 0     Sig: DISSOLVE 1 TABLET(8 MG) ON THE TONGUE EVERY 8 HOURS AS NEEDED FOR NAUSEA       There is no refill protocol information for this order            Recent Outpatient Visits              3 months ago [facility]    Office Visit    1 year ago     [place]  [facility], Lei POLLARD, PT    Office Visit    1 year ago     [place]  [facility], Lei POLLARD, PT    Office Visit    1 year ago [facility], Laisha Johns MD    Telemedicine    1 year ago     [place]  [facility], Lei POLLARD, PT    Office Visit

## 2024-09-16 RX ORDER — ONDANSETRON 8 MG/1
8 TABLET, ORALLY DISINTEGRATING ORAL EVERY 8 HOURS PRN
Qty: 30 TABLET | Refills: 0 | Status: SHIPPED | OUTPATIENT
Start: 2024-09-16

## 2024-09-19 RX ORDER — ONDANSETRON 8 MG/1
8 TABLET, ORALLY DISINTEGRATING ORAL EVERY 8 HOURS PRN
Qty: 30 TABLET | Refills: 0 | OUTPATIENT
Start: 2024-09-19

## 2024-10-21 RX ORDER — ONDANSETRON 8 MG/1
8 TABLET, ORALLY DISINTEGRATING ORAL EVERY 8 HOURS PRN
Qty: 30 TABLET | Refills: 0 | Status: SHIPPED | OUTPATIENT
Start: 2024-10-21

## 2024-10-21 NOTE — TELEPHONE ENCOUNTER
Please Review. Protocol Failed; No Protocol     Requested Prescriptions   Pending Prescriptions Disp Refills    ondansetron 8 MG Oral Tablet Dispersible 30 tablet 0     Sig: Take 1 tablet (8 mg total) by mouth every 8 (eight) hours as needed for Nausea.       There is no refill protocol information for this order              Recent Outpatient Visits              5 months ago [facility] [place]    Office Visit    1 year ago     [place]  [facility]    Office Visit    1 year ago     [place]  [facility]    Office Visit    1 year ago Hypermobility of [facility], Laisha Johns MD    Telemedicine    1 year ago     [place]  [facility], Lei POLLARD, PT    Office Visit

## 2024-12-03 RX ORDER — ONDANSETRON 8 MG/1
8 TABLET, ORALLY DISINTEGRATING ORAL EVERY 8 HOURS PRN
Qty: 30 TABLET | Refills: 0 | Status: SHIPPED | OUTPATIENT
Start: 2024-12-03

## 2024-12-03 NOTE — TELEPHONE ENCOUNTER
Please review. Protocol Failed; No Protocol    Requested Prescriptions   Pending Prescriptions Disp Refills    ondansetron 8 MG Oral Tablet Dispersible 30 tablet 0     Sig: Take 1 tablet (8 mg total) by mouth every 8 (eight) hours as needed for Nausea.       There is no refill protocol information for this order            Future Appointments         Provider Department Appt Notes    In 1 month Laisha Cox MD [facility] follow up on existing issues/medication          Recent Outpatient Visits              [facility] months [facility] [place] [facility]    Office Visit    1 year ago     [place]  [facility], Lei POLLARD, PT    Office Visit    1 year ago     [place]  [facility]    Office Visit    [facility] La[facility]    Telemedicine    1 year ago     [place]  [facility], Lei POLLARD, PT    Office Visit

## 2024-12-10 NOTE — LETTER
VACCINE ADMINISTRATION RECORD  PARENT / GUARDIAN APPROVAL  Date: 2018  Vaccine administered to: Huber Ritchie     : 8/15/2000    MRN: [id number]    A copy of the appropriate Centers for Disease Control and Prevention Vaccine Information statement 11:59

## 2025-01-06 ENCOUNTER — OFFICE VISIT (OUTPATIENT)
Dept: FAMILY MEDICINE CLINIC | Facility: CLINIC | Age: 25
End: 2025-01-06
Payer: COMMERCIAL

## 2025-01-06 VITALS
HEIGHT: 71 IN | WEIGHT: 174.38 LBS | SYSTOLIC BLOOD PRESSURE: 121 MMHG | BODY MASS INDEX: 24.41 KG/M2 | HEART RATE: 78 BPM | DIASTOLIC BLOOD PRESSURE: 78 MMHG

## 2025-01-06 DIAGNOSIS — K21.9 GASTROESOPHAGEAL REFLUX DISEASE WITHOUT ESOPHAGITIS: ICD-10-CM

## 2025-01-06 DIAGNOSIS — M24.9 HYPERMOBILITY OF JOINT: ICD-10-CM

## 2025-01-06 DIAGNOSIS — G90.A POTS (POSTURAL ORTHOSTATIC TACHYCARDIA SYNDROME): Primary | ICD-10-CM

## 2025-01-06 DIAGNOSIS — R14.0 ABDOMINAL BLOATING: ICD-10-CM

## 2025-01-06 PROCEDURE — 99213 OFFICE O/P EST LOW 20 MIN: CPT | Performed by: FAMILY MEDICINE

## 2025-01-06 PROCEDURE — 3074F SYST BP LT 130 MM HG: CPT | Performed by: FAMILY MEDICINE

## 2025-01-06 PROCEDURE — 3008F BODY MASS INDEX DOCD: CPT | Performed by: FAMILY MEDICINE

## 2025-01-06 PROCEDURE — 3078F DIAST BP <80 MM HG: CPT | Performed by: FAMILY MEDICINE

## 2025-01-06 RX ORDER — DIAZEPAM 2 MG/1
2 TABLET ORAL EVERY 12 HOURS PRN
Qty: 20 TABLET | Refills: 0 | Status: SHIPPED | OUTPATIENT
Start: 2025-01-06

## 2025-01-06 NOTE — PROGRESS NOTES
Kizzy Covarrubias is a 24 year old female.   Chief Complaint   Patient presents with    Follow - Up     HPI:   Concerned about RCPD - continues to have issues with abdominal bloating and unable to burp. Read up about Dr. Chanel with [employer] [facility] [employer]. Has tried muscle relaxants for her hypermobility but does not help this issue.   Medications Ordered Prior to Encounter[1]   Past Medical History:    Esophageal reflux      Social History:  Social History     Socioeconomic History    Marital status: Single   Tobacco Use    Smoking status: Never    Smokeless tobacco: Never   Vaping Use    Vaping status: Never Used   Substance and Sexual Activity    Alcohol use: No    Drug use: No   Other Topics Concern    Pt has a pacemaker No    Pt has a defibrillator No    Reaction to local anesthetic No        REVIEW OF SYSTEMS:   Review of Systems   See HPI     EXAM:   /78   Pulse 78   Ht 5' 11\" (1.803 m)   Wt 174 lb 6.4 oz (79.1 kg)   LMP 12/06/2024   BMI 24.32 kg/m²   GENERAL: well developed, well nourished,in no apparent distress      ASSESSMENT AND PLAN:   1. POTS (postural orthostatic tachycardia syndrome)    - ENT Referral - In Network    2. Hypermobility of joint    - diazePAM (VALIUM) 2 MG Oral Tab; Take 1 tablet (2 mg total) by mouth every 12 (twelve) hours as needed (spasms).  Dispense: 20 tablet; Refill: 0  - ENT Referral - In Network    3. 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      Laisha Cox MD  1/6/2025  9:59 AM         [1]   Current Outpatient Medications on File Prior to Visit   Medication Sig Dispense Refill    ondansetron 8 MG Oral Tablet Dispersible Take 1 tablet (8 mg total) by mouth every 8 (eight) hours as needed for Nausea. 30 tablet 0    dicyclomine 20 MG Oral Tab Take 1 tablet (20 mg total)  by mouth 4 (four) times daily. BEFORE MEALS AND NIGHTLY 120 tablet 2    Norethin Ace-Eth Estrad-FE (MICROGESTIN FE 1.5/30) 1.5-30 MG-MCG Oral Tab Take 1 tablet by mouth daily. 84 tablet 5    cyclobenzaprine 5 MG Oral Tab Take 1 tablet (5 mg total) by mouth 3 (three) times daily as needed for Muscle spasms. 30 tablet 0    pantoprazole 40 MG Oral Tab EC Take 1 tablet (40 mg total) by mouth 2 (two) times daily before meals. 180 tablet 3     No current facility-administered medications on file prior to visit.

## 2025-01-09 RX ORDER — DICYCLOMINE HCL 20 MG
20 TABLET ORAL 4 TIMES DAILY
Qty: 120 TABLET | Refills: 2 | Status: SHIPPED | OUTPATIENT
Start: 2025-01-09

## 2025-01-09 RX ORDER — ONDANSETRON 8 MG/1
8 TABLET, ORALLY DISINTEGRATING ORAL EVERY 8 HOURS PRN
Qty: 30 TABLET | Refills: 0 | Status: SHIPPED | OUTPATIENT
Start: 2025-01-09

## 2025-01-09 NOTE — TELEPHONE ENCOUNTER
Please review. Protocol Failed; No Protocol    Requested Prescriptions   Pending Prescriptions Disp Refills    ondansetron 8 MG Oral Tablet Dispersible 30 tablet 0     Sig: Take 1 tablet (8 mg total) by mouth every 8 (eight) hours as needed for Nausea.       There is no refill protocol information for this order      Signed Prescriptions Disp Refills    dicyclomine 20 MG Oral Tab 120 tablet 2     Sig: Take 1 tablet (20 mg total) by mouth 4 (four) times daily. BEFORE MEALS AND NIGHTLY       Gastrointestional Medication Protocol Passed - 1/9/2025  1:46 PM        Passed - In person appointment or virtual visit in the past 12 mos or appointment in next 3 mos     Recent Outpatient Visits              3 days ago [facility] (postural orthostatic tachycardia syndrome)    [facility]    7 months ago [facility] of [facility] Laisha Cox MD    Office Visit    1 year ago     [place]  [facility]    Office Visit    1 year ago     [place]  [facility], Lei POLLARD, PT    Office Visit    1 year ago Hypermobility of [facility] Laisha Cox MD    Telemedicine          Future Appointments         Provider Department Appt Notes    In 3 weeks Edu Butts DO [facility] Postural Orthostatic Tachycardia Syndrome    In 3 weeks Maria G Khoury APRN [facility] Gastroesophageal reflux disease without esophagitis  R14.0 (ICD-10-CM) - 787.3 (ICD-9-CM) - Abdominal bloating   / policy advised/ mother made appt    In 1 month Laisha Cox MD [facility] follow up after specialist visits                    Passed - Medication is active on med list               Future  Appointments         Provider Department Appt Notes    In 3 weeks Edu Butts DO [facility] Postural Orthostatic Tachycardia Syndrome    In 3 weeks Maria G Khoury APRN [facility] [place] Gastroesophageal reflux disease without esophagitis  R14.0 (ICD-10-CM) - 787.3 (ICD-9-CM) - Abdominal bloating   / policy advised/ mother made appt    In 1 month Laisha Cox MD [facility] follow up after specialist visits          Recent Outpatient Visits              3 days ago POTS (postural orthostatic tachycardia syndrome)    [facility] Laisha Cox MD    Office Visit    7 months [facility]    1 year ago     [place]  [facility], Lei POLLARD, PT    Office Visit    1 year ago     [place]  [facility], Lei POLLARD, PT    Office Visit    1 year ago Hypermobility of [facility] Laisha Cox MD    Telemedicine

## 2025-01-20 RX ORDER — NORETHINDRONE ACETATE AND ETHINYL ESTRADIOL 1.5-30(21)
1 KIT ORAL DAILY
Qty: 84 TABLET | Refills: 5 | OUTPATIENT
Start: 2025-01-20

## 2025-02-03 ENCOUNTER — OFFICE VISIT (OUTPATIENT)
Dept: OTOLARYNGOLOGY | Facility: CLINIC | Age: 25
End: 2025-02-03
Payer: COMMERCIAL

## 2025-02-03 DIAGNOSIS — R13.13 CRICOPHARYNGEAL DYSPHAGIA: Primary | ICD-10-CM

## 2025-02-03 DIAGNOSIS — G90.A POTS (POSTURAL ORTHOSTATIC TACHYCARDIA SYNDROME): ICD-10-CM

## 2025-02-04 ENCOUNTER — VIRTUAL PHONE E/M (OUTPATIENT)
Facility: CLINIC | Age: 25
End: 2025-02-04
Payer: COMMERCIAL

## 2025-02-04 DIAGNOSIS — R14.0 ABDOMINAL BLOATING: Primary | ICD-10-CM

## 2025-02-04 DIAGNOSIS — K21.9 GASTROESOPHAGEAL REFLUX DISEASE, UNSPECIFIED WHETHER ESOPHAGITIS PRESENT: ICD-10-CM

## 2025-02-04 DIAGNOSIS — R10.84 GENERALIZED ABDOMINAL PAIN: ICD-10-CM

## 2025-02-04 PROCEDURE — 98014 SYNCH AUDIO-ONLY EST MOD 30: CPT | Performed by: NURSE PRACTITIONER

## 2025-02-04 NOTE — H&P
[facility] - Gastroenterology  Telehealth Visit                                                                                                               Reason for consult: GERD, dysphagia    Requesting physician or provider: Laisha Cox MD    No chief complaint on file.      HPI:   Kizzy Covarrubias is a 24 year old year-old female with active medical diagnoses including POTS and GERD.     She iss present for telehealth visit  The patient verbally consents to a Virtual/Telephone Check-In visit on 02/04/25.   Patient understands and accepts financial responsibility for any deductible, co-insurance and/or co-pays associated with this service.       Last seen 2021 by Rebecca Pham:  Seen by PCP-chronic reflux since elementary school.  H.pylori (-) OFF of PPI  Started pantoprazole in Oct w/ some improvement, but not resolution  PCP suspected IBS and prescribed trial of dicyclomine-helped with post-prandial abd cramping. Cramping in periumbilical area. No epigastric/ruq pain. No fever/chills.      Today:  Continues to have a \"mix of abdominal pain\"  Daily bowel movements, no change. No weight loss. No blood in stool   Bloating caues her a lot of pain, has to lay down/rest to get rid of the pain  Has to avoid eating if she has an event later in the day  Has tried the FODmap diet without any relief   Has tried Omeprazole, now on Pantoprazole 40mg BID  Bentyl and FD guard not effective   Has cut out dairy, maybe a small difference but not much  Symptoms not worse during menstrual cycles   Saw ENT yesterday- was given esophagram order and laryngologist referral    Last EGD:  Date of procedure: 01/27/22     Procedure: EGD w/biopsies     Pre-operative diagnosis: acid reflux and bloating     Post-operative diagnosis: irregular z line with two columns of 0.5-1 cm of salmon colored mucosa biopsied to rule out barretts     Findings:       1. Esophagus: The squamocolumnar junction was noted at 41 cm and appeared  irregular with two columns on 0.5-1 cm salmon colored mucosa biopsied to rule out barretts. The GE junction was noted at 41 cm from the incisors. No significant hiatal hernia. The esophageal mucosa appeared normal. There was no endoscopic findings of esophagitis, stricture or Redd's esophagus.     2. Stomach: The stomach distended normally. Normal rugal folds were seen. The pylorus was patent. The gastric mucosa appeared normal. Retroflexion revealed a normal fundus and a normal-patulous cardia.      3. Duodenum: The duodenal mucosa appeared normal in the 1st and 2nd portion of the duodenum. Biopsies taken to rule out celiac       NSAIDS: none  Tobacco: none  Alcohol: social  Marijuana: none  Illicit drugs: none    FH GI malignancy: none  FH IBD: none    No history of adverse reaction to sedation  No PARAG  No anticoagulants  No pacemaker/defibrillator  No pain medications and/or sleep aides    Wt Readings from Last 6 Encounters:   01/06/25 174 lb 6.4 oz (79.1 kg)   05/21/24 174 lb 3.2 oz (79 kg)   04/05/23 186 lb 6.4 oz (84.6 kg)   09/08/22 176 lb 6.4 oz (80 kg)   04/05/22 175 lb 6.4 oz (79.6 kg) (93%, Z= 1.51)*   01/27/22 167 lb (75.8 kg) (91%, Z= 1.33)*     * Growth percentiles are based on [facility] (Girls, 2-20 Years) data.        History, Medications, Allergies, ROS:      Past Medical History:    Esophageal reflux      No past surgical history on file.   Family Hx: No family history on file.   Social History:   Social History     Socioeconomic History    Marital status: Single   Tobacco Use    Smoking status: Never    Smokeless tobacco: Never   Vaping Use    Vaping status: Never Used   Substance and Sexual Activity    Alcohol use: Yes     Alcohol/week: 2.0 standard drinks of alcohol     Types: 2 Standard drinks or equivalent per week    Drug use: No   Other Topics Concern    Pt has a pacemaker No    Pt has a defibrillator No    Reaction to local anesthetic No        Medications (Active prior to today's  visit):  Current Outpatient Medications   Medication Sig Dispense Refill    dicyclomine 20 MG Oral Tab Take 1 tablet (20 mg total) by mouth 4 (four) times daily. BEFORE MEALS AND NIGHTLY 120 tablet 2    ondansetron 8 MG Oral Tablet Dispersible Take 1 tablet (8 mg total) by mouth every 8 (eight) hours as needed for Nausea. 30 tablet 0    diazePAM (VALIUM) 2 MG Oral Tab Take 1 tablet (2 mg total) by mouth every 12 (twelve) hours as needed (spasms). 20 tablet 0    Norethin Ace-Eth Estrad-FE (MICROGESTIN FE 1.5/30) 1.5-30 MG-MCG Oral Tab Take 1 tablet by mouth daily. 84 tablet 5    pantoprazole 40 MG Oral Tab EC Take 1 tablet (40 mg total) by mouth 2 (two) times daily before meals. 180 tablet 3       Allergies:  Allergies[1]    ROS:   CONSTITUTIONAL: negative for fevers, chills, sweats  EYES Negative for scleral icterus or redness, and diplopia  HEENT: Negative for hoarseness  RESPIRATORY: Negative for cough and severe shortness of breath  CARDIOVASCULAR: Negative for crushing sub-sternal chest pain  GASTROINTESTINAL: See HPI  GENITOURINARY: Negative for dysuria  MUSCULOSKELETAL: Negative for arthralgias and myalgias  SKIN: Negative for jaundice, rash or pruritus  NEUROLOGICAL: Negative for dizziness and headaches  BEHAVIOR/PSYCH: Negative for psychotic behavior    PHYSICAL EXAM:   Last menstrual period 12/06/2024.    Limited evaluation; virtual visit.     GEN: Alert, awake  HEENT: clear speech  LUNGS: No conversational dyspnea  ABDOMEN: patient denies pain to self-palpation   NEURO: oriented  PSYCH: appropriate mood     Labs/Imaging/Procedures:     Patient's pertinent labs and imaging were reviewed and discussed with patient today.        .  ASSESSMENT/PLAN:   Kizzy Covarrubias is a 24 year old year-old female with active medical diagnoses including POTS and GERD.     #abdominal bloating  #GERD  #bloating  No prior abdominal imaging. Will obtain CT.  Discussed trying SIBO treatment.  Will obtain esophagram as ordered by  ENT.  Needs to schedule with laryngologist.         Duration of the service: 30 minutes    Orders This Visit:  No orders of the defined types were placed in this encounter.      Meds This Visit:  Requested Prescriptions      No prescriptions requested or ordered in this encounter       Imaging & Referrals:  None      NOREEN Medina    [facility] Gastroenterology  2/4/2025        This note was partially prepared using Dragon Medical voice recognition dictation software. As a result, errors may occur. When identified, these errors have been corrected. While every attempt is made to correct errors during dictation, discrepancies may still exist.          [1] No Known Allergies

## 2025-02-04 NOTE — PROGRESS NOTES
[place]  OTOLARYNGOLOGY - HEAD & NECK SURGERY    2/3/2025     Reason for Consultation:   Esophageal problem, gastritis, bloating    History of Present Illness:   Patient is a pleasant 24 year old female who is being seen for longstanding issue which she has had for many years but has been worsening over the past year.  Patient states that she has had an inability to burp as she has a tightness in the upper throat which prevents her from doing so.  This causes significant bloating and gastric distention.  She has been seen by a GI specialist and had upper GI endoscopy which did not reveal anything remarkable.  Patient has not seen an ENT for this problem previously.  She does have pain in the throat as well on occasion due to this.  At times she has reflux that is so bad it can cause gagging episodes and coughing.  She is currently on a PPI but states that this does not really help prevent the reflux.  Additionally she has been diagnosed with potential postural orthostatic tachycardia syndrome.  She was referred to see me for further evaluation and take steps to hopefully help treat her issue.    Past Medical History  Past Medical History:    Esophageal reflux       Past Surgical History  History reviewed. No pertinent surgical history.    Family History  History reviewed. No pertinent family history.    Social History  Pediatric History   Patient Parents    Macie Covarrubias (Mother)     Other Topics Concern    Grew up on a farm Not Asked    History of tanning Not Asked    Outdoor occupation Not Asked    Pt has a pacemaker No    Pt has a defibrillator No    Breast feeding Not Asked    Reaction to local anesthetic No   Social History Narrative    Not on file           Current Medications:  Current Outpatient Medications   Medication Sig Dispense Refill    dicyclomine 20 MG Oral Tab Take 1 tablet (20 mg total) by mouth 4 (four) times daily. BEFORE MEALS AND NIGHTLY 120 tablet 2    ondansetron 8 MG Oral Tablet Dispersible  Take 1 tablet (8 mg total) by mouth every 8 (eight) hours as needed for Nausea. 30 tablet 0    diazePAM (VALIUM) 2 MG Oral Tab Take 1 tablet (2 mg total) by mouth every 12 (twelve) hours as needed (spasms). 20 tablet 0    Norethin Ace-Eth Estrad-FE (MICROGESTIN FE 1.5/30) 1.5-30 MG-MCG Oral Tab Take 1 tablet by mouth daily. 84 tablet 5    pantoprazole 40 MG Oral Tab EC Take 1 tablet (40 mg total) by mouth 2 (two) times daily before meals. 180 tablet 3       Allergies  Allergies[1]    Review of Systems:   A comprehensive 10 point review of systems was completed.  Pertinent positives and negatives noted in the the HPI.    Physical Exam:   Last menstrual period 12/06/2024.    GENERAL: No acute distress, Comfortable appearing  FACE: HB 1/6, Normal Animation  HEAD: Normocephalic  EYES: EOMI, pupils equil  EARS: Bilateral Auricles Symmetric, bilateral tympanic membranes appear normal  NOSE: Nares patent bilaterally  ORAL CAVITY: Tongue mobile, Oropharynx clear, Floor of mouth clear, Posterior oropharynx normal  NECK: No palpable lymphadenopathy, thyroid not palpable, nontender    PROCEDURE: FLEXIBLE FIBEROPTIC LARYNGOSCOPY (86568)    Due to inability for adequate examination of the larynx and need for magnification to perform the examination, endoscopy was performed.  Risks and benefits were discussed with patient/family and they have given verbal consent to proceed.    Procedure Detail & Findings:     After placement of topical anesthetic intranasally the flexible laryngoscope was inserted into the nare and driven through the nasal cavity with no significant abnormal findings noted in the nasal cavities or nasopharynx. The oropharynx, hypopharynx and larynx were subsequently examined and the following findings were noted:    Base of Tongue: Normal    Valeculla: Normal    Arytenoids: Normal    Introitus of the esophagus: Minimal post glottic edema    Epiglottis: Normal    False vocal cords: Normal    True vocal cords:  Normal    Subglottic space: Normal    Mobility of True vocal cords: Normal    Condition: Stable    Complications: Patient tolerated the procedure well with no immediate complication.      Results:     Laboratory Data:  Lab Results   Component Value Date    WBC 5.6 06/15/2024    HGB 12.3 06/15/2024    HCT 37.5 06/15/2024    .0 06/15/2024    CREATSERUM 0.74 06/15/2024    BUN 7 (L) 06/15/2024     06/15/2024    K 4.1 06/15/2024     06/15/2024    CO2 24.0 06/15/2024    GLU 80 06/15/2024    CA 9.2 06/15/2024    ALB 4.2 06/15/2024    ALKPHO 83 06/15/2024    TP 6.8 06/15/2024    AST 17 06/15/2024    ALT 21 06/15/2024    TSH 2.843 06/15/2024    CRP <0.29 03/11/2022    B12 430 06/15/2024         Imaging:  No results found.      Impression:       ICD-10-CM    1. Cricopharyngeal dysphagia  R13.13 ENT Referral - External     XR UPPER GI SINGLE CONTRAST (CPT=74240)      2. POTS (postural orthostatic tachycardia syndrome)  G90.A            Recommendations:  I would like to obtain esophagram for her to see if there is a cricopharyngeal bar.  Additionally I would like her to see a laryngologist to see if Botox is potentially a treatment for her.  Once we have the results of the esophagram I will reach out to her.    Thank you for allowing me to participate in the care of your patient.    Edu Butts,    Otolaryngology/Rhinology, Sinus, and Endoscopic Skull Base Surgery  [address] [place] [address]  Phone [phone]  Fax [phone]  2/3/2025  8:56 PM  2/3/2025          [1] No Known Allergies

## 2025-02-24 ENCOUNTER — HOSPITAL ENCOUNTER (OUTPATIENT)
Dept: GENERAL RADIOLOGY | Facility: HOSPITAL | Age: 25
Discharge: HOME | End: 2025-02-24
Attending: STUDENT IN AN ORGANIZED HEALTH CARE EDUCATION/TRAINING PROGRAM
Payer: COMMERCIAL

## 2025-02-24 DIAGNOSIS — R13.13 CRICOPHARYNGEAL DYSPHAGIA: ICD-10-CM

## 2025-02-24 PROCEDURE — 74246 X-RAY XM UPR GI TRC 2CNTRST: CPT | Performed by: STUDENT IN AN ORGANIZED HEALTH CARE EDUCATION/TRAINING PROGRAM

## 2025-02-25 RX ORDER — ONDANSETRON 8 MG/1
8 TABLET, ORALLY DISINTEGRATING ORAL EVERY 8 HOURS PRN
Qty: 30 TABLET | Refills: 0 | Status: SHIPPED | OUTPATIENT
Start: 2025-02-25

## 2025-03-06 ENCOUNTER — TELEPHONE (OUTPATIENT)
Facility: CLINIC | Age: 25
End: 2025-03-06

## 2025-03-06 NOTE — TELEPHONE ENCOUNTER
1st,overdue reminder letter mailed out to patient    Imaging order     Orders Placed on 2/4/25    CT ABDOMEN+PELVIS(CONTRAST ONLY)(CPT=74177)

## 2025-04-02 NOTE — TELEPHONE ENCOUNTER
Faxed DME order and chart notes for Nebulizer to [facility] [phone].   Pt's mom returned call, verified name and . Reviewed medication instructions as noted below. Mom agreed with plan of care and had no further questions at this time.

## 2025-04-07 RX ORDER — NORETHINDRONE ACETATE AND ETHINYL ESTRADIOL 1.5-30(21)
1 KIT ORAL DAILY
Qty: 84 TABLET | Refills: 3 | Status: SHIPPED | OUTPATIENT
Start: 2025-04-07

## 2025-04-07 NOTE — TELEPHONE ENCOUNTER
Please review. Protocol failed / No protocol.    Requested Prescriptions   Pending Prescriptions Disp Refills    Norethin Ace-Eth Estrad-FE (MICROGESTIN FE 1.5/30) 1.5-30 MG-MCG Oral Tab 84 tablet 5     Sig: Take 1 tablet by mouth daily.       Gynecology Medication Protocol Failed - 4/7/2025  1:56 PM        Failed - PASS-PENDING LAST PAP WNL--VIA MANUAL LOOKUP        Passed - Medication is active on med list        Passed - Physical or Pelvic/Breast in past 12 or next 3 mos--VIA MANUAL LOOKUP             Recent Outpatient Visits              2 months ago Abdominal bloating    [facility] [place]    Virtual Phone E/M    2 months ago Cricopharyngeal dysphagia    [facility] [place]    Office Visit    3 months ago POTS (postural orthostatic tachycardia syndrome)    [facility], Laisha Johns MD    Office Visit    10 months ago Hypermobility of joint    [facility], Laisha Johns MD    Office Visit    1 year ago     [place]  [facility] [place] [facility] [facility] Lei POLLARD, GANESH    Office Visit

## 2025-04-08 RX ORDER — ONDANSETRON 8 MG/1
8 TABLET, ORALLY DISINTEGRATING ORAL EVERY 8 HOURS PRN
Qty: 30 TABLET | Refills: 3 | Status: SHIPPED | OUTPATIENT
Start: 2025-04-08

## 2025-04-09 RX ORDER — ONDANSETRON 8 MG/1
8 TABLET, ORALLY DISINTEGRATING ORAL EVERY 8 HOURS PRN
Qty: 30 TABLET | Refills: 0 | OUTPATIENT
Start: 2025-04-09

## 2025-04-14 ENCOUNTER — TELEPHONE (OUTPATIENT)
Dept: OTOLARYNGOLOGY | Facility: CLINIC | Age: 25
End: 2025-04-14

## 2025-04-14 NOTE — TELEPHONE ENCOUNTER
Yes I think actually since they are HMO it has to be from the PCP, they probably won't take referrals from a specialist

## 2025-04-14 NOTE — TELEPHONE ENCOUNTER
Dr. Butts, mom said she needs referral for Dr. Chanel, wouldn't the PCP have to do the referrals?  Please advise.    See mom's message below:    Pt is being referred to [facility]. Testing appointment 4-21-25 and surgery 4-22-25. Need referral for day one with the following cpt codes 27466, 76203, 41534.     Day two need referral for the following cpt codes 43108, 28609 post op visit.

## 2025-04-14 NOTE — TELEPHONE ENCOUNTER
Pt's mother called.  Pt is being referred to [facility].  Testing appointment 4-21-25 and surgery  4-22-25.   Need referral day one with cpt codes 06038, [id number], 82851.  Day two need referral cpt codes 11628, 59819 post op visit.  Fax to [phone] attention ajit stern.   Please call

## 2025-04-15 ENCOUNTER — TELEPHONE (OUTPATIENT)
Dept: FAMILY MEDICINE CLINIC | Facility: CLINIC | Age: 25
End: 2025-04-15

## 2025-04-15 DIAGNOSIS — R13.13 CRICOPHARYNGEAL DYSPHAGIA: Primary | ICD-10-CM

## 2025-04-15 NOTE — TELEPHONE ENCOUNTER
Patient saw Dr. Corbin Chanel and recommends botox. Has appt for Monday and Tuesday for the procedures. First day is testing and then botox on Tuesday. Asking for referral for procedures - not botox. They know out of pocket.   [place] please call his office for clinical notes. Need today to start referral request.

## 2025-04-15 NOTE — TELEPHONE ENCOUNTER
Pended three separate referral with corresponding CPT codes per [facility] please review diagnosis and sign off if you agree.

## 2025-04-15 NOTE — TELEPHONE ENCOUNTER
Pt was not actually seen - appt is this Monday and those were requested referrals. I had asked back in February for this to be reviewed bc no [facility] docs do this procedure. Dr Butts recommended.

## 2025-04-15 NOTE — TELEPHONE ENCOUNTER
Hello    We can submit this request again for review.  Would it be possible to have a letter of medical necessity written and sent to me so I can include that with my resubmission?  Please let me know.   Thank you!  Adelina

## 2025-04-16 NOTE — TELEPHONE ENCOUNTER
Patient called asking for a sooner appointment. I scheduled her for 05/27/2025 using RES24 slot per message below.

## 2025-04-16 NOTE — TELEPHONE ENCOUNTER
Left message to call back to schedule physical for May and to inform her that referral have been placed.

## 2025-04-16 NOTE — TELEPHONE ENCOUNTER
Yes I sent the letter. It is in her chart.   Morelia - please call pt for her physical for next month.

## 2025-04-16 NOTE — TELEPHONE ENCOUNTER
Talked to patient told her message below understood and she says she'll call back for an appointment.

## 2025-04-16 NOTE — TELEPHONE ENCOUNTER
Patient calling asking for status of update on referrals placed.   I gave referral dept number for her to call and transferred to location to check status and see if approved or progress.     Did not want to schedule visit now, will work on this later.   Explained MD gave ok for us to use sooner visit then what she will find online in [facility].   She states understanding, not wanting to schedule with me on the line.

## 2025-05-27 ENCOUNTER — OFFICE VISIT (OUTPATIENT)
Dept: FAMILY MEDICINE CLINIC | Facility: CLINIC | Age: 25
End: 2025-05-27
Payer: COMMERCIAL

## 2025-05-27 VITALS
WEIGHT: 179.81 LBS | HEIGHT: 71 IN | HEART RATE: 71 BPM | DIASTOLIC BLOOD PRESSURE: 84 MMHG | SYSTOLIC BLOOD PRESSURE: 133 MMHG | BODY MASS INDEX: 25.17 KG/M2

## 2025-05-27 DIAGNOSIS — G90.A POTS (POSTURAL ORTHOSTATIC TACHYCARDIA SYNDROME): ICD-10-CM

## 2025-05-27 DIAGNOSIS — F41.9 ANXIETY: ICD-10-CM

## 2025-05-27 DIAGNOSIS — M24.9 HYPERMOBILITY OF JOINT: ICD-10-CM

## 2025-05-27 DIAGNOSIS — K21.9 GASTROESOPHAGEAL REFLUX DISEASE WITHOUT ESOPHAGITIS: ICD-10-CM

## 2025-05-27 DIAGNOSIS — Z00.00 ROUTINE MEDICAL EXAM: ICD-10-CM

## 2025-05-27 DIAGNOSIS — R13.13 CRICOPHARYNGEAL DYSPHAGIA: Primary | ICD-10-CM

## 2025-05-27 PROCEDURE — 3075F SYST BP GE 130 - 139MM HG: CPT | Performed by: FAMILY MEDICINE

## 2025-05-27 PROCEDURE — 3008F BODY MASS INDEX DOCD: CPT | Performed by: FAMILY MEDICINE

## 2025-05-27 PROCEDURE — 3079F DIAST BP 80-89 MM HG: CPT | Performed by: FAMILY MEDICINE

## 2025-05-27 PROCEDURE — 99395 PREV VISIT EST AGE 18-39: CPT | Performed by: FAMILY MEDICINE

## 2025-05-27 RX ORDER — PANTOPRAZOLE SODIUM 40 MG/1
40 TABLET, DELAYED RELEASE ORAL
Qty: 180 TABLET | Refills: 3 | Status: SHIPPED | OUTPATIENT
Start: 2025-05-27

## 2025-05-27 RX ORDER — ONDANSETRON 8 MG/1
8 TABLET, ORALLY DISINTEGRATING ORAL EVERY 8 HOURS PRN
Qty: 30 TABLET | Refills: 3 | Status: SHIPPED | OUTPATIENT
Start: 2025-05-27

## 2025-05-27 NOTE — PROGRESS NOTES
The following individual(s) verbally consented to be recorded using ambient AI listening technology and understand that they can each withdraw their consent to this listening technology at any point by asking the clinician to turn off or pause the recording:    Patient name: Kizzy Covarrubias    Subjective:   Kizzy Covarrubias is a 24 year old female who presents for Physical     History/Other:   History of Present Illness  Kizzy Covarrubias is a 24 year old female who presents for a regular physical and follow-up on sensory issues.    Will see provider at [facility] for her dysphagia - knows insurance will not cover it.     She has ongoing symptoms of gastroesophageal reflux disease (GERD) and takes pantoprazole twice daily. Symptoms worsen significantly if she misses a dose, with variability in severity from day to day. She also takes Zofran once daily, typically after meals, to manage nausea. Bentyl is used for diarrhea and upset stomach, though the frequency is unspecified. Valium was tried for pain management but was ineffective, and she uses it infrequently.    She has previously engaged in physical therapy for pain management related to hypermobility issues. She continues to incorporate exercises learned in therapy into her gym routine, noting some improvement but not complete resolution of symptoms.    She maintains a healthy lifestyle, including regular exercise and a balanced diet. No pain in the upper body and variability in sensitivity depending on bloating.   Chief Complaint Reviewed and Verified  No Further Nursing Notes to   Review  Allergies Reviewed  Medications Reviewed  Problem List Reviewed           Tobacco:  She has never smoked tobacco.    Current Medications[1]      Review of Systems:  Review of Systems  See HPI     Objective:   /84   Pulse 71   Ht 5' 11\" (1.803 m)   Wt 179 lb 12.8 oz (81.6 kg)   LMP 04/27/2025   BMI 25.08 kg/m²  Estimated body mass index is 25.08  kg/m² as calculated from the following:    Height as of this encounter: 5' 11\" (1.803 m).    Weight as of this encounter: 179 lb 12.8 oz (81.6 kg).  Physical Exam  Constitutional:       Appearance: She is well-developed.   HENT:      Right Ear: Tympanic membrane normal.      Left Ear: Tympanic membrane normal.   Eyes:      Conjunctiva/sclera: Conjunctivae normal.   Cardiovascular:      Rate and Rhythm: Normal rate and regular rhythm.      Heart sounds: Normal heart sounds.   Pulmonary:      Effort: Pulmonary effort is normal.      Breath sounds: Normal breath sounds.   Abdominal:      General: Bowel sounds are normal.      Palpations: Abdomen is soft.   Musculoskeletal:      Comments: Long arms, hyperflexia    Skin:     General: Skin is warm and dry.   Neurological:      Mental Status: She is alert and oriented to person, place, and time.      Deep Tendon Reflexes: Reflexes are normal and symmetric.   Psychiatric:         Behavior: Behavior normal.       Results          Assessment & Plan:   1. Cricopharyngeal dysphagia (Primary)  2. POTS (postural orthostatic tachycardia syndrome)  3. Hypermobility of joint  4. Anxiety  5. Gastroesophageal reflux disease without esophagitis  6. Routine medical exam  Other orders  -     Pantoprazole Sodium; Take 1 tablet (40 mg total) by mouth 2 (two) times daily before meals.  Dispense: 180 tablet; Refill: 3  -     Ondansetron; Take 1 tablet (8 mg total) by mouth every 8 (eight) hours as needed for Nausea.  Dispense: 30 tablet; Refill: 3    Assessment & Plan  Gastroesophageal reflux disease (GERD)  Symptoms controlled with pantoprazole, worsen without it.  - Renew pantoprazole prescription at [facility].    Nausea  Postprandial nausea managed with as-needed Zofran.  - Renew Zofran prescription at [facility].    General Health Maintenance  Pap smear and Tdap booster due. Up to date with dental visits and regular exercise.  - Schedule Pap smear in the next few months.  - Administer  Tdap booster vaccine.    Follow-up  Upcoming appointment for ongoing care.  - Follow up with [facility] on June 11-12.      Return for pap smear - in next few months .      Laisha Cox MD, 5/27/2025, 11:55 AM             [1]   Current Outpatient Medications   Medication Sig Dispense Refill    pantoprazole 40 MG Oral Tab EC Take 1 tablet (40 mg total) by mouth 2 (two) times daily before meals. 180 tablet 3    ondansetron 8 MG Oral Tablet Dispersible Take 1 tablet (8 mg total) by mouth every 8 (eight) hours as needed for Nausea. 30 tablet 3    Norethin Ace-Eth Estrad-FE (MICROGESTIN FE 1.5/30) 1.5-30 MG-MCG Oral Tab Take 1 tablet by mouth daily. 84 tablet 3    dicyclomine 20 MG Oral Tab Take 1 tablet (20 mg total) by mouth 4 (four) times daily. BEFORE MEALS AND NIGHTLY 120 tablet 2

## (undated) DIAGNOSIS — K21.00 GASTROESOPHAGEAL REFLUX DISEASE WITH ESOPHAGITIS WITHOUT HEMORRHAGE: ICD-10-CM

## (undated) DIAGNOSIS — R14.0 BLOATING: ICD-10-CM

## (undated) DIAGNOSIS — K21.9 GASTROESOPHAGEAL REFLUX DISEASE WITHOUT ESOPHAGITIS: ICD-10-CM

## (undated) DIAGNOSIS — M35.7 HYPERMOBILITY SYNDROME: Primary | ICD-10-CM

## (undated) DIAGNOSIS — M24.9 HYPERMOBILITY OF JOINT: Primary | ICD-10-CM

## (undated) DIAGNOSIS — R10.9 ABDOMINAL CRAMPING: ICD-10-CM

## (undated) DIAGNOSIS — R11.0 NAUSEA: ICD-10-CM

## (undated) DIAGNOSIS — R10.33 PERIUMBILICAL ABDOMINAL PAIN: ICD-10-CM

## (undated) DEVICE — FORCEP RADIAL JAW 4

## (undated) DEVICE — CONMED SCOPE SAVER BITE BLOCK, 20X27 MM: Brand: SCOPE SAVER

## (undated) DEVICE — MEDI-VAC NON-CONDUCTIVE SUCTION TUBING 6MM X 1.8M (6FT.) L: Brand: CARDINAL HEALTH

## (undated) DEVICE — KIT ENDO ORCAPOD 160/180/190

## (undated) DEVICE — LINE MNTR ADLT SET O2 INTMD

## (undated) DEVICE — 3 ML SYRINGE LUER-LOCK TIP: Brand: MONOJECT

## (undated) DEVICE — 6 ML SYRINGE LUER-LOCK TIP: Brand: MONOJECT

## (undated) DEVICE — 35 ML SYRINGE REGULAR TIP: Brand: MONOJECT

## (undated) DEVICE — KIT CLEAN ENDOKIT 1.1OZ GOWNX2

## (undated) NOTE — LETTER
02/17/22        [address] [place]      Dear Mirandarich Domingo,    [facility] records indicate that you have outstanding lab work and or testing that was ordered for you and has not yet been completed:     C-REACTIVE PROTEIN   CBC WITH DIFFERENTIAL WITH PLATELET   COMP METABOLIC PANEL (14)   IMMUNOGLOBULIN A, QUANT   TISSUE TRANSGLUTAMINASE AB IGA   TSH W REFLEX TO FREE T4   VITAMIN B12    To provide you with the best possible care, please complete these orders at your earliest convenience. If you have recently completed these orders please disregard this letter. If you have any questions please call the office at [phone].     Thank you,     The Staff at [place], APRN

## (undated) NOTE — LETTER
4/16/2025          To Whom It May Concern:    Kizzy Covarrubias is currently under my medical care. Kizzy suffers from cricopharyngeal dysphagia, postural orthostatic tachycardia syndrome and hypermobility syndrome. She has seen GI and ENT within [facility] for her cricopharyngeal dysphagia symptoms which includes an inability to burp as she has a tightness in the upper throat which prevents her from doing so. This causes significant bloating and gastric distention. ENT specialist Dr. Butts recommended patient see Dr. Chanel with [facility] for Botox injections. There are no [facility] physicians that do this within [facility]. Please approve her external referral for treatments.   If you require additional information please contact our office.        Sincerely,    Laisha Cox MD          Document generated by:  Laisha Cox MD

## (undated) NOTE — MR AVS SNAPSHOT
[facility] [address] [address] [address]  [phone]               Thank you for choosing us for your health care visit with Anson Mcclendon MD.  We are glad to serve you and happy to provide you with this summary of An initiative of the American Academy of Pediatrics    Fact Sheet: Healthy Active Living for Families    Healthy nutrition starts as early as infancy with breastfeeding.  Once your baby begins eating solid foods, introduce nutritious foods early on and ofte

## (undated) NOTE — LETTER
3/6/2025          Kizzy Covarrubias    [address]    [place] [address]         Dear Kizzy,    Our records indicate that the tests ordered for you by NOREEN Medina  have not been done.  If you have, in fact, already completed the tests or you do not wish to have the tests done, please contact our office at THE NUMBER LISTED BELOW.  Otherwise, please proceed with the testing.  Enclosed is a duplicate order for your convenience.   Imaging order     Orders Placed on 2/4/25  CT ABDOMEN+PELVIS(CONTRAST ONLY)(CPT=74177)    To schedule a test at any [facility], call Central Scheduling at [phone], Monday through Friday between 7:30am to 6pm and on Saturday between 8am and 1pm.   Evening and weekend appointments for your exam are available.       Sincerely,    NOREEN Medina  [address]  [facility] [id number] [phone]

## (undated) NOTE — LETTER
VACCINE ADMINISTRATION RECORD  PARENT / GUARDIAN APPROVAL  Date: 2018  Vaccine administered to: Suzan Lanza     : 8/15/2000    MRN: [id number]    A copy of the appropriate Centers for Disease Control and Prevention Vaccine Information statement

## (undated) NOTE — LETTER
September 10, 2019     Barry Payer  [address]  [place] [address]      Dear Claudene Sack:    Below are the results from your recent visit: Negative strep culture.        Resulted Orders   STREP A ASSAY W/OPTIC   Result Value Ref Range    Strep Grp

## (undated) NOTE — LETTER
VACCINE ADMINISTRATION RECORD  PARENT / GUARDIAN APPROVAL  Date: 2019  Vaccine administered to: Sanaz Vera     : 8/15/2000    MRN: [id number]    A copy of the appropriate Centers for Disease Control and Prevention Vaccine Information statement

## (undated) NOTE — LETTER
VACCINE ADMINISTRATION RECORD  PARENT / GUARDIAN APPROVAL  Date: 10/30/2018  Vaccine administered to: Queen Andrey     : 8/15/2000    MRN: [id number]    A copy of the appropriate Centers for Disease Control and Prevention Vaccine Information statement